# Patient Record
Sex: FEMALE | Race: WHITE | NOT HISPANIC OR LATINO | Employment: FULL TIME | URBAN - METROPOLITAN AREA
[De-identification: names, ages, dates, MRNs, and addresses within clinical notes are randomized per-mention and may not be internally consistent; named-entity substitution may affect disease eponyms.]

---

## 2017-04-18 ENCOUNTER — HOSPITAL ENCOUNTER (OUTPATIENT)
Dept: RADIOLOGY | Age: 42
Discharge: HOME | End: 2017-04-18
Payer: COMMERCIAL

## 2017-04-18 DIAGNOSIS — R92.2 INCONCLUSIVE MAMMOGRAM: ICD-10-CM

## 2017-04-18 DIAGNOSIS — Z12.31 ENCOUNTER FOR SCREENING MAMMOGRAM FOR MALIGNANT NEOPLASM OF BREAST: ICD-10-CM

## 2017-07-11 ENCOUNTER — HOSPITAL ENCOUNTER (OUTPATIENT)
Dept: RADIOLOGY | Age: 42
Discharge: HOME | End: 2017-07-11
Payer: COMMERCIAL

## 2017-07-11 PROCEDURE — 77063 BREAST TOMOSYNTHESIS BI: CPT

## 2017-07-11 PROCEDURE — G0202 SCR MAMMO BI INCL CAD: HCPCS

## 2017-09-28 ENCOUNTER — ALLSCRIPTS OFFICE VISIT (OUTPATIENT)
Dept: OTHER | Facility: OTHER | Age: 42
End: 2017-09-28

## 2017-10-27 NOTE — PROGRESS NOTES
Assessment  1  Encounter for annual routine gynecological examination (V72 31) (Z01 419)    Plan  Contraceptive management    · Sprintec 28 0 25-35 MG-MCG Oral Tablet; TAKE 1 TABLET DAILY   Rx By: Hailee Mazariegos; Dispense: 84 Days ; #:84 Tablet; Refill: 0;For: Contraceptive management; CHALINO = N; Verified Transmission to [facility] (Mail Order); Last Updated By: System, Voxa; 9/28/2017 11:57:15 AM  Encounter for screening mammogram for breast cancer    · MAMMO SCREENING BILATERAL W 3D & CAD; Status:Hold For - Exact  Date,Retrospective By Protocol Authorization; Requested for:After [id number]; Perform:[facility]; Last Updated Yeison Steen; 9/28/2017 12:00:07 PM;Ordered;For:Encounter for screening mammogram for breast cancer; Ordered By:Amilcar Lisa;    Discussion/Summary    #1  Pap smear deferred due to low risk status, last Pap 2016Encouraged self breast examination as well as calcium supplementationContinue Sprintec times one yearContinue annual mammogram-3-D due to breast densityReturn to office in one year  The patient has the current Goals: Continue preventative screening  The patent has the current Barriers: No barriers  Patient is able to Self-Care  Possible side effects of new medications were reviewed with the patient/guardian today  The treatment plan was reviewed with the patient/guardian  The patient/guardian understands and agrees with the treatment plan      Chief Complaint  Annual visit      History of Present Illness  HPI: This is a 13-year-old female presents for her annual GYN exam  She offers no complaints today  Her menstrual cycles are regular every 4 weeks lasting 3-4 days with no breakthrough bleeding  She states that she feels better on birth control pills  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, 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  She ultimately conceived through IVF   She is up to date with her mammogram  is sexually active and in a monogamous relationship x 15 years with her   All her Pap smears have been normal       Review of Systems    Cardiovascular: no complaints of slow or fast heart rate, no chest pain, no palpitations, no leg claudication or lower extremity edema  Respiratory: no complaints of shortness of breath, no wheezing, no dyspnea on exertion, no orthopnea or PND  Breasts: no complaints of breast pain, breast lump or nipple discharge  Gastrointestinal: no complaints of abdominal pain, no constipation, no nausea or diarrhea, no vomiting, no bloody stools  Genitourinary: no complaints of dysuria, no incontinence, no pelvic pain, no dysmenorrhea, no vaginal discharge or abnormal vaginal bleeding  Over the past 2 weeks, how often have you been bothered by the following problems? 1 ) Little interest or pleasure in doing things? Not at all    2 ) Feeling down, depressed or hopeless? Not at all    3 ) Trouble falling asleep or sleeping too much? Not at all    4 ) Feeling tired or having little energy? Not at all    5 ) Poor appetite or overeating? Not at all    6 ) Feeling bad about yourself, or that you are a failure, or have let yourself or your family down? Not at all    7 ) Trouble concentrating on things, such as reading a newspaper or watching television? Not at all    8 ) Moving or speaking so slowly that other people could have noticed, or the opposite, moving or speaking faster than usual? Not at all    9 ) Thoughts that you would be better off dead or of hurting yourself in some way? Not at all  Score 0     ROS reviewed  OB History  Pregnancy History (Brief):   Prior pregnancies: : 1  Para:   Delivery type: 1  section       Active Problems  1  Acute upper respiratory infection (465 9) (J06 9)   2  Breast density (611 79) (R92 2)   3  Chronic allergic conjunctivitis (372 14) (H10 45)   4  Contraceptive management (V25 9) (Z30 9)   5   Encounter for annual routine gynecological examination (V72 31) (Z01 419) 6  Encounter for routine gynecological examination (V72 31) (Z01 419)   7  Encounter for screening mammogram for breast cancer (V76 12) (Z12 31)   8  Irritable bowel syndrome (564 1) (K58 9)   9  Migraine headache (346 90) (G43 909)   10  Need for influenza vaccination (V04 81) (Z23)   11  Palpitations (785 1) (R00 2)   12  Pityriasis rosea (696 3) (L42)   13  Tear film insufficiency, unspecified laterality (375 15) (H17 118)    Past Medical History   · Acute bronchitis (466 0) (J20 9)   · Acute pharyngitis (462) (J02 9)   · History of HELLP syndrome (642 50) (O14 20)   · History of acute sinusitis (V12 69) (Z87 09)   · History of anemia (V12 3) (Z86 2)   · History of infertility (V13 29) (Z87 42)   · History of Irregular menses (626 4) (N92 6)   · Pityriasis rosea (696 3) (L42)    The active problems and past medical history were reviewed and updated today  Surgical History   · History of  Section   · History of Complete Colonoscopy   · History of Knee Arthroscopy (Therapeutic)    The surgical history was reviewed and updated today  Family History  Mother    · Family history of Diabetes Mellitus (V18 0)  Father    · Family history of Benign Essential Hypertension   · Family history of Hypertension (V17 49)  Paternal Grandmother    · Family history of Breast Cancer (V16 3)    The family history was reviewed and updated today  Social History   · Being A Social Drinker   · Denied: History of Drug Use   · Educational Level - Completed Bachelors Degree   · Marital History - Currently    · 1 CHILD   · Never A Smoker   · Occupation:   · Human relations  The social history was reviewed and updated today  Current Meds   1  Sprintec 28 0 25-35 MG-MCG Oral Tablet; TAKE 1 TABLET DAILY; Therapy: [id number] to [phone])  Requested for: 2017; Last   Rx:2017 Ordered    Allergies  1   No Known Drug Allergies    Vitals   Recorded: [id number] 49:80NX   Systolic 645   Diastolic 80   Height 5 ft 7 3 in   Weight 189 lb    BMI Calculated 29 34   BSA Calculated 1 99   LMP 06-Sep-2017     Physical Exam    Constitutional   General appearance: No acute distress, well appearing and well nourished  Pulmonary   Auscultation of lungs: Clear to auscultation  Cardiovascular   Auscultation of heart: Normal rate and rhythm, normal S1 and S2, no murmurs  Genitourinary   External genitalia: Normal and no lesions appreciated  Vagina: Normal, no lesions or dryness appreciated  Urethral meatus: Normal     Cervix: Normal, no palpable masses  Uterus: Normal, non-tender, not enlarged, and no palpable masses  Adnexa/parametria: Normal, non-tender and no fullness or masses appreciated  Chest   Breasts: Normal and no dimpling or skin changes noted  Abdomen   Abdomen: Normal, non-tender, and no organomegaly noted         Signatures   Electronically signed by : Bernard Cho DO; Sep 28 2017 12:03PM EST                       (Author)

## 2018-01-13 VITALS
DIASTOLIC BLOOD PRESSURE: 80 MMHG | BODY MASS INDEX: 29.66 KG/M2 | HEIGHT: 67 IN | WEIGHT: 189 LBS | SYSTOLIC BLOOD PRESSURE: 116 MMHG

## 2018-01-17 NOTE — RESULT NOTES
Verified Results  (1) CBC/PLT/DIFF 30Apr2016 10:38AM [facility]     Test Name Result Flag Reference   WBC COUNT 6 90 Thousand/uL  4 31-10 16   RBC COUNT 4 11 Million/uL  3 81-5 12   HEMOGLOBIN 11 7 g/dL  11 5-15 4   HEMATOCRIT 36 3 %  34 8-46  1   MCV 88 fL  82-98   MCH 28 5 pg  26 8-34 3   MCHC 32 2 g/dL  31 4-37 4   RDW 13 3 %  11 6-15 1   MPV 11 9 fL  8 9-12 7   PLATELET COUNT 574 Thousands/uL  149-390   nRBC AUTOMATED 0 /100 WBCs     NEUTROPHILS RELATIVE PERCENT 64 %  43-75   LYMPHOCYTES RELATIVE PERCENT 27 %  14-44   MONOCYTES RELATIVE PERCENT 8 %  4-12   EOSINOPHILS RELATIVE PERCENT 1 %  0-6   BASOPHILS RELATIVE PERCENT 0 %  0-1   NEUTROPHILS ABSOLUTE COUNT 4 38 Thousands/µL  1 85-7 62   LYMPHOCYTES ABSOLUTE COUNT 1 87 Thousands/µL  0 60-4 47   MONOCYTES ABSOLUTE COUNT 0 54 Thousand/µL  0 17-1 22   EOSINOPHILS ABSOLUTE COUNT 0 06 Thousand/µL  0 00-0 61   BASOPHILS ABSOLUTE COUNT 0 03 Thousands/µL  0 00-0 10     (1) COMPREHENSIVE METABOLIC PANEL [id number] 19:53UY [facility] recommendations are as follows:  GFR calculation is accurate only with a steady state creatinine  Chronic Kidney disease less than 60 ml/min/1 73 sq  meters  Kidney failure less than 15 ml/min/1 73 sq  meters  Test Name Result Flag Reference   GLUCOSE,RANDM 81 mg/dL     If the patient is fasting, the ADA then defines impaired fasting glucose as > 100 mg/dL and diabetes as > or equal to 123 mg/dL     SODIUM 138 mmol/L  136-145   POTASSIUM 4 7 mmol/L  3 5-5 3   CHLORIDE 105 mmol/L  100-108   CARBON DIOXIDE 28 mmol/L  21-32   ANION GAP (CALC) 5 mmol/L  4-13   BLOOD UREA NITROGEN 17 mg/dL  5-25   CREATININE 0 91 mg/dL  0 60-1 30   Standardized to IDMS reference method   CALCIUM 8 9 mg/dL  8 3-10 1   BILI, TOTAL 0 34 mg/dL  0 20-1 00   ALK PHOSPHATAS 80 U/L     ALT (SGPT) 30 U/L  12-78   AST(SGOT) 15 U/L  5-45   ALBUMIN 3 4 g/dL L 3 5-5 0   TOTAL PROTEIN 7 3 g/dL  6 4-8 2 eGFR Non-African American      >60 0 ml/min/1 73sq m     (1) MAGNESIUM [id number] 10:38AM Critical [facility] hospital     Test Name Result Flag Reference   MAGNESIUM 2 2 mg/dL  1 6-2 6     (1) TSH WITH FT4 REFLEX [id number] 10:38AM [facility]   Patients undergoing fluorescein dye angiography may retain small amounts of fluorescein in the body for 48-72 hours post procedure  Samples containing fluorescein can produce falsely depressed TSH values  If the patient had this procedure,a specimen should be resubmitted post fluorescein clearance  The recommended reference ranges for TSH during pregnancy are as follows:  First trimester 0 1 to 2 5 uIU/mL  Second trimester  0 2 to 3 0 uIU/mL  Third trimester 0 3 to 3 0 uIU/m     Test Name Result Flag Reference   TSH 1 100 uIU/mL  0 358-3 740       Discussion/Summary   Labs look OK   Dr Vijaya Sorensen

## 2018-07-01 DIAGNOSIS — Z12.31 ENCOUNTER FOR SCREENING MAMMOGRAM FOR MALIGNANT NEOPLASM OF BREAST: ICD-10-CM

## 2018-10-09 ENCOUNTER — ANNUAL EXAM (OUTPATIENT)
Dept: OBGYN CLINIC | Facility: CLINIC | Age: 43
End: 2018-10-09
Payer: COMMERCIAL

## 2018-10-09 VITALS
DIASTOLIC BLOOD PRESSURE: 86 MMHG | BODY MASS INDEX: 31.49 KG/M2 | WEIGHT: 207.8 LBS | HEIGHT: 68 IN | SYSTOLIC BLOOD PRESSURE: 116 MMHG

## 2018-10-09 DIAGNOSIS — N92.1 EXCESSIVE AND FREQUENT MENSTRUATION WITH IRREGULAR CYCLE: ICD-10-CM

## 2018-10-09 DIAGNOSIS — Z12.39 BREAST CANCER SCREENING: ICD-10-CM

## 2018-10-09 DIAGNOSIS — Z01.419 ENCOUNTER FOR ANNUAL ROUTINE GYNECOLOGICAL EXAMINATION: Primary | ICD-10-CM

## 2018-10-09 PROCEDURE — 87624 HPV HI-RISK TYP POOLED RSLT: CPT | Performed by: OBSTETRICS & GYNECOLOGY

## 2018-10-09 PROCEDURE — G0145 SCR C/V CYTO,THINLAYER,RESCR: HCPCS | Performed by: OBSTETRICS & GYNECOLOGY

## 2018-10-09 PROCEDURE — S0612 ANNUAL GYNECOLOGICAL EXAMINA: HCPCS | Performed by: OBSTETRICS & GYNECOLOGY

## 2018-10-09 RX ORDER — NORGESTIMATE AND ETHINYL ESTRADIOL
KIT
COMMUNITY
Start: 2018-08-14 | End: 2018-10-09 | Stop reason: SDUPTHER

## 2018-10-09 RX ORDER — NORGESTIMATE AND ETHINYL ESTRADIOL
1 KIT DAILY
Qty: 84 TABLET | Refills: 3 | Status: SHIPPED | OUTPATIENT
Start: 2018-10-09 | End: 2019-10-11 | Stop reason: SDUPTHER

## 2018-10-09 NOTE — PROGRESS NOTES
Assessment/Plan:    Pap smear done as well as annual   Encouraged self-breast examination as well as calcium supplementation  Continue annual 3D mammogram   Continue OCPs x1 year  I am recommending repeat CBC with diff, ferritin and iron given her history of iron deficiency anemia  Return to office in 1 year  I will notify her the above results via telephone  No problem-specific Assessment & Plan notes found for this encounter  Diagnoses and all orders for this visit:    Encounter for annual routine gynecological examination    Breast cancer screening  -     Mammo screening bilateral w 3d & cad; Future    Excessive and frequent menstruation with irregular cycle  -     PREVIFEM 0 25-35 MG-MCG per tablet; Take 1 tablet by mouth daily  -     CBC and differential  -     Ferritin  -     Iron    Other orders  -     Discontinue: PREVIFEM 0 25-35 MG-MCG per tablet;           Subjective:      Patient ID: Devi Montana is a 37 y o  female  HPI     This is a 15-year-old female  ( x1, age 3) presents for her annual gyn exam   Patient has been on birth control pills for the last 3 years for cycle control  She states there significant improvement, every 28 days lasting 3-4 days with no breakthrough bleeding  Her menstrual flow has decreased significantly  She did have routine labs done approximately 18 months ago and was informed that she was iron deficient  She denies any changes in bowel or bladder function  She is sexually active and has been in a monogamous relationship with her  for over 16 years  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, 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      The following portions of the patient's history were reviewed and updated as appropriate: allergies, current medications, past family history, past medical history, past social history, past surgical history and problem list     Review of Systems   Constitutional: Negative for fatigue, fever and unexpected weight change  Respiratory: Negative for cough, chest tightness, shortness of breath and wheezing  Cardiovascular: Negative  Negative for chest pain and palpitations  Gastrointestinal: Negative  Negative for abdominal distention, abdominal pain, blood in stool, constipation, diarrhea, nausea and vomiting  Genitourinary: Negative  Negative for difficulty urinating, dyspareunia, dysuria, flank pain, frequency, genital sores, hematuria, pelvic pain, urgency, vaginal bleeding, vaginal discharge and vaginal pain  Skin: Negative for rash  Objective:      /86   Ht 5' 8" (1 727 m)   Wt 94 3 kg (207 lb 12 8 oz)   LMP 10/03/2018 (Exact Date)   Breastfeeding? No   BMI 31 60 kg/m²          Physical Exam   Constitutional: She appears well-developed and well-nourished  Cardiovascular: Normal rate and regular rhythm  Pulmonary/Chest: Effort normal and breath sounds normal  Right breast exhibits no inverted nipple, no mass, no nipple discharge, no skin change and no tenderness  Left breast exhibits no inverted nipple, no mass, no nipple discharge, no skin change and no tenderness  Abdominal: Soft  Bowel sounds are normal  She exhibits no distension  There is no tenderness  There is no rebound and no guarding  Genitourinary: Rectum normal, vagina normal and uterus normal  There is no lesion on the right labia  There is no lesion on the left labia  Cervix exhibits no discharge and no friability  Right adnexum displays no mass, no tenderness and no fullness  Left adnexum displays no mass, no tenderness and no fullness  No vaginal discharge found

## 2018-10-11 LAB
HPV HR 12 DNA CVX QL NAA+PROBE: NEGATIVE
HPV16 DNA CVX QL NAA+PROBE: NEGATIVE
HPV18 DNA CVX QL NAA+PROBE: NEGATIVE
LAB AP GYN PRIMARY INTERPRETATION: NORMAL
LAB AP LMP: NORMAL
Lab: NORMAL

## 2018-11-16 ENCOUNTER — APPOINTMENT (OUTPATIENT)
Dept: LAB | Age: 43
End: 2018-11-16
Payer: COMMERCIAL

## 2018-11-16 LAB
BASOPHILS # BLD AUTO: 0.04 THOUSANDS/ΜL (ref 0–0.1)
BASOPHILS NFR BLD AUTO: 1 % (ref 0–1)
EOSINOPHIL # BLD AUTO: 0.1 THOUSAND/ΜL (ref 0–0.61)
EOSINOPHIL NFR BLD AUTO: 2 % (ref 0–6)
ERYTHROCYTE [DISTWIDTH] IN BLOOD BY AUTOMATED COUNT: 12.4 % (ref 11.6–15.1)
FERRITIN SERPL-MCNC: 37 NG/ML (ref 8–388)
HCT VFR BLD AUTO: 35.7 % (ref 34.8–46.1)
HGB BLD-MCNC: 11.4 G/DL (ref 11.5–15.4)
IMM GRANULOCYTES # BLD AUTO: 0.01 THOUSAND/UL (ref 0–0.2)
IMM GRANULOCYTES NFR BLD AUTO: 0 % (ref 0–2)
IRON SERPL-MCNC: 48 UG/DL (ref 50–170)
LYMPHOCYTES # BLD AUTO: 1.86 THOUSANDS/ΜL (ref 0.6–4.47)
LYMPHOCYTES NFR BLD AUTO: 29 % (ref 14–44)
MCH RBC QN AUTO: 29.1 PG (ref 26.8–34.3)
MCHC RBC AUTO-ENTMCNC: 31.9 G/DL (ref 31.4–37.4)
MCV RBC AUTO: 91 FL (ref 82–98)
MONOCYTES # BLD AUTO: 0.48 THOUSAND/ΜL (ref 0.17–1.22)
MONOCYTES NFR BLD AUTO: 8 % (ref 4–12)
NEUTROPHILS # BLD AUTO: 3.94 THOUSANDS/ΜL (ref 1.85–7.62)
NEUTS SEG NFR BLD AUTO: 60 % (ref 43–75)
NRBC BLD AUTO-RTO: 0 /100 WBCS
PLATELET # BLD AUTO: 233 THOUSANDS/UL (ref 149–390)
PMV BLD AUTO: 12.9 FL (ref 8.9–12.7)
RBC # BLD AUTO: 3.92 MILLION/UL (ref 3.81–5.12)
WBC # BLD AUTO: 6.43 THOUSAND/UL (ref 4.31–10.16)

## 2018-11-16 PROCEDURE — 85025 COMPLETE CBC W/AUTO DIFF WBC: CPT | Performed by: OBSTETRICS & GYNECOLOGY

## 2018-11-16 PROCEDURE — 82728 ASSAY OF FERRITIN: CPT | Performed by: OBSTETRICS & GYNECOLOGY

## 2018-11-16 PROCEDURE — 83540 ASSAY OF IRON: CPT | Performed by: OBSTETRICS & GYNECOLOGY

## 2018-11-16 PROCEDURE — 36415 COLL VENOUS BLD VENIPUNCTURE: CPT | Performed by: OBSTETRICS & GYNECOLOGY

## 2018-11-20 ENCOUNTER — TELEPHONE (OUTPATIENT)
Dept: OBGYN CLINIC | Facility: CLINIC | Age: 43
End: 2018-11-20

## 2018-11-20 NOTE — TELEPHONE ENCOUNTER
----- Message from Azul Wheeler DO sent at 11/18/2018  6:37 PM EST -----  Inform pt labs c/w slight iron deficiency anemia, rec feso4 during week of menses   Call if menses increases

## 2018-12-14 ENCOUNTER — HOSPITAL ENCOUNTER (OUTPATIENT)
Dept: RADIOLOGY | Age: 43
Discharge: HOME | End: 2018-12-14
Payer: COMMERCIAL

## 2018-12-14 VITALS — BODY MASS INDEX: 32.96 KG/M2 | WEIGHT: 210 LBS | HEIGHT: 67 IN

## 2018-12-14 DIAGNOSIS — Z12.39 BREAST CANCER SCREENING: ICD-10-CM

## 2018-12-14 PROCEDURE — 77063 BREAST TOMOSYNTHESIS BI: CPT

## 2018-12-14 PROCEDURE — 77067 SCR MAMMO BI INCL CAD: CPT

## 2019-09-12 ENCOUNTER — OFFICE VISIT (OUTPATIENT)
Dept: FAMILY MEDICINE CLINIC | Facility: CLINIC | Age: 44
End: 2019-09-12
Payer: COMMERCIAL

## 2019-09-12 VITALS
DIASTOLIC BLOOD PRESSURE: 80 MMHG | TEMPERATURE: 98.5 F | HEIGHT: 67 IN | HEART RATE: 75 BPM | WEIGHT: 205 LBS | BODY MASS INDEX: 32.18 KG/M2 | SYSTOLIC BLOOD PRESSURE: 110 MMHG | RESPIRATION RATE: 16 BRPM | OXYGEN SATURATION: 99 %

## 2019-09-12 DIAGNOSIS — G43.009 MIGRAINE WITHOUT AURA AND WITHOUT STATUS MIGRAINOSUS, NOT INTRACTABLE: ICD-10-CM

## 2019-09-12 DIAGNOSIS — Z00.00 WELL ADULT EXAM: Primary | ICD-10-CM

## 2019-09-12 DIAGNOSIS — R53.83 OTHER FATIGUE: ICD-10-CM

## 2019-09-12 DIAGNOSIS — E61.1 LOW IRON: ICD-10-CM

## 2019-09-12 DIAGNOSIS — Z23 NEED FOR VACCINATION: ICD-10-CM

## 2019-09-12 DIAGNOSIS — E53.8 LOW SERUM VITAMIN B12: ICD-10-CM

## 2019-09-12 DIAGNOSIS — D64.9 ANEMIA, UNSPECIFIED TYPE: ICD-10-CM

## 2019-09-12 DIAGNOSIS — R55 PRE-SYNCOPE: ICD-10-CM

## 2019-09-12 PROBLEM — B00.1 RECURRENT COLD SORES: Status: ACTIVE | Noted: 2019-09-12

## 2019-09-12 PROCEDURE — 90471 IMMUNIZATION ADMIN: CPT | Performed by: FAMILY MEDICINE

## 2019-09-12 PROCEDURE — 99396 PREV VISIT EST AGE 40-64: CPT | Performed by: FAMILY MEDICINE

## 2019-09-12 PROCEDURE — 3008F BODY MASS INDEX DOCD: CPT | Performed by: FAMILY MEDICINE

## 2019-09-12 PROCEDURE — 90715 TDAP VACCINE 7 YRS/> IM: CPT | Performed by: FAMILY MEDICINE

## 2019-09-12 PROCEDURE — 99213 OFFICE O/P EST LOW 20 MIN: CPT | Performed by: FAMILY MEDICINE

## 2019-09-12 PROCEDURE — 93000 ELECTROCARDIOGRAM COMPLETE: CPT | Performed by: FAMILY MEDICINE

## 2019-09-12 RX ORDER — VALACYCLOVIR HYDROCHLORIDE 1 G/1
TABLET, FILM COATED ORAL
Refills: 3 | COMMUNITY
Start: 2019-08-04 | End: 2021-11-26 | Stop reason: SDUPTHER

## 2019-09-12 NOTE — PROGRESS NOTES
Assessment/Plan:   Problems/concerns addressed in this visit are addressed in the attached note  Well adult exam  ·         Continue healthy diet   ·         Encourage exercise 4 times a week or more for minimum 30 minutes  ·         Continue to see dentist, wear seatbelt  ·         Health maintenance reviewed and up-to-date - Tdap today  Will get flu shot next month  Update fasting blood work  Reviewed age appropriate health maintenance screenings and immunizations that are due, risks and benefits of these  Health Maintenance   Topic Date Due    BMI: Followup Plan  03/04/1991    DTaP,Tdap,and Td Vaccines (1 - Tdap) 03/04/1994    INFLUENZA VACCINE  07/01/2019    Depression Screening PHQ  10/09/2019    BMI: Adult  12/14/2019    MAMMOGRAM  12/14/2019    PAP SMEAR  10/09/2023    Pneumococcal Vaccine: 65+ Years (1 of 2 - PCV13) 03/04/2038    Pneumococcal Vaccine: Pediatrics (0 to 5 Years) and At-Risk Patients (6 to 59 Years)  Aged Out    HEPATITIS B VACCINES  Aged Out     Return for Annual physical   Patient Instructions     Please get fasting blood work  I will send the results through [address]  Then we can come up with a plan for your headaches  I will see you back after we work on this plan  A medicine I am thinking of for your is Topamax  Chart your headaches:  HEADACHE DIARY   MONTH   DATE  TIME  (eg  Morning, evening etc )  DURATION  SEVERITY  ( 0-10 )  TRIGGERS  MEDICATION  & DOSE  RESPONSE  to medicine  COMMENTS                                                                          EXAMPLES OF TRIGGERS  Stress, Lack of sleep, Excessive sleep, Menstrual days, Changes in weather,  Excessive Video games, TV, computers, cell phones  Starvation / Skipping meals, Poor hydration,   MSG, artificial sweeteners, preservatives like nitrates and nitrites    Processed meat, Canned foods, nuts, Excessive caffeine etc               Subjective:    HPI     Kleberjer Ramos is a 53 y o  female who presents today for a physical      Chief Complaint   Patient presents with    Migraine    Fatigue     Patient felt faint with nausea, ear ringing       ---Above per clinical staff & reviewed  ---    Diet: good , WW in past   Exercise:  2 times a week [employer] bike   Dental visits:  yes  Seatbelt: yes     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 dentist   Wears seatbelt       Concerns today: addressed in additional note today           The following portions of the patient's history were reviewed and updated as appropriate: allergies, current medications, past family history, past medical history, past social history, past surgical history and problem list      Current Outpatient Medications   Medication Sig Dispense Refill    valACYclovir (VALTREX) 1,000 mg tablet TAKE TWO TABS BY MOUTH AT ONSET, THEN TWO TABS 12 HRS  LATER  3    PREVIFEM 0 25-35 MG-MCG per tablet Take 1 tablet by mouth daily 84 tablet 3     No current facility-administered medications for this visit  Objective:      /80 (BP Location: Left arm)   Pulse 75   Temp 98 5 °F (36 9 °C)   Resp 16   Ht 5' 6 93" (1 7 m)   Wt 93 kg (205 lb)   SpO2 99%   BMI 32 18 kg/m²     BP Readings from Last 3 Encounters:   09/12/19 110/80   10/09/18 116/86   09/28/17 116/80     Wt Readings from Last 3 Encounters:   09/12/19 93 kg (205 lb)   12/14/18 95 3 kg (210 lb)   10/09/18 94 3 kg (207 lb 12 8 oz)       Review of Systems  She has no other concerns  No unexpected weight changes  No chest pain, SOB, + palpitations  No GERD  No changes in bowels or bladder  Sleeping well  No mood changes  + headache   Physical Exam   Physical Exam   Constitutional:  she appears well-developed and well-nourished  HENT: Head: Normocephalic  Neck: Neck supple     Cardiovascular: Normal rate, regular rhythm and normal heart sounds    Pulmonary/Chest: Effort normal and breath sounds normal  No wheezes, rales, or rhonchi  Abdominal: Soft  Bowel sounds are normal  There is no tenderness  No hepatosplenomegaly  Musculoskeletal: she exhibits no edema  Lymphadenopathy: she has no cervical adenopathy  Neurological: she is alert and oriented to person, place, and time  Skin: Skin is warm and dry  Psychiatric: she has a normal mood and affect   her behavior is normal  Thought content normal

## 2019-09-12 NOTE — PATIENT INSTRUCTIONS
Please get fasting blood work  I will send the results through [address]  Then we can come up with a plan for your headaches  I will see you back after we work on this plan  A medicine I am thinking of for your is Topamax  Chart your headaches:  HEADACHE DIARY   MONTH   DATE  TIME  (eg  Morning, evening etc )  DURATION  SEVERITY  ( 0-10 )  TRIGGERS  MEDICATION  & DOSE  RESPONSE  to medicine  COMMENTS                                                                          EXAMPLES OF TRIGGERS  Stress, Lack of sleep, Excessive sleep, Menstrual days, Changes in weather,  Excessive Video games, TV, computers, cell phones  Starvation / Skipping meals, Poor hydration,   MSG, artificial sweeteners, preservatives like nitrates and nitrites    Processed meat, Canned foods, nuts, Excessive caffeine etc

## 2019-09-12 NOTE — PROGRESS NOTES
Assessment/Plan:  1  Other fatigue  Differential dx reviewed  Will start with labs  No snoring noted  No exposures noted  Does not endorse any depression or anxiety symptoms    - CBC and differential; Future  - Comprehensive metabolic panel; Future  - Ferritin; Future  - Lipid panel; Future  - Vitamin B12; Future  - Vitamin D 25 hydroxy; Future  - TSH, 3rd generation with Free T4 reflex; Future  - Iron; Future    2  Pre-syncope  Episode consistent with vasovagal episode  Again, will check labs  EKG today no acute changes  No further workup needed at this time, but to let us know if any other episdoes    - POCT ECG  - CBC and differential; Future  - Comprehensive metabolic panel; Future  - Ferritin; Future  - Lipid panel; Future  - Vitamin B12; Future  - Vitamin D 25 hydroxy; Future  - TSH, 3rd generation with Free T4 reflex; Future  - Iron; Future    3  Need for vaccination  - TDAP VACCINE GREATER THAN OR EQUAL TO 6YO IM    4  Anemia, unspecified type  Update labs with iron and B12   - CBC and differential; Future  - Comprehensive metabolic panel; Future  - Ferritin; Future  - Lipid panel; Future  - Vitamin B12; Future  - Vitamin D 25 hydroxy; Future  - TSH, 3rd generation with Free T4 reflex; Future  - Iron; Future    5  Low iron  Update labs   - CBC and differential; Future  - Comprehensive metabolic panel; Future  - Ferritin; Future  - Lipid panel; Future  - Vitamin B12; Future  - Vitamin D 25 hydroxy; Future  - TSH, 3rd generation with Free T4 reflex; Future  - Iron; Future    6  Low serum vitamin B12  Update labs   - CBC and differential; Future  - Comprehensive metabolic panel; Future  - Ferritin; Future  - Lipid panel; Future  - Vitamin B12; Future  - Vitamin D 25 hydroxy; Future  - TSH, 3rd generation with Free T4 reflex; Future  - Iron; Future    7  Migraine without aura and without status migrainosus, not intractable  Pt with frequent headaches  Will need treatment  Start with labs for a treatable cause  Chart headache to look for triggers  If this do not point to anything she is willing to start a daily med for treatment  Options reviewed - consider Topamax  Side effects risks and benefits reviewed  6  Well adult exam  See other notes    BMI Counseling: Body mass index is 32 18 kg/m²  Discussed the patient's BMI with her  The BMI is above normal  Nutrition recommendations include reducing portion sizes  Exercise recommendations include exercising 3-5 times per week  Return for Annual physical   Patient Instructions     Please get fasting blood work  I will send the results through [address]  Then we can come up with a plan for your headaches  I will see you back after we work on this plan  A medicine I am thinking of for your is Topamax  Chart your headaches:  HEADACHE DIARY   MONTH   DATE  TIME  (eg  Morning, evening etc )  DURATION  SEVERITY  ( 0-10 )  TRIGGERS  MEDICATION  & DOSE  RESPONSE  to medicine  COMMENTS                                                                          EXAMPLES OF TRIGGERS  Stress, Lack of sleep, Excessive sleep, Menstrual days, Changes in weather,  Excessive Video games, TV, computers, cell phones  Starvation / Skipping meals, Poor hydration,   MSG, artificial sweeteners, preservatives like nitrates and nitrites  Processed meat, Canned foods, nuts, Excessive caffeine etc              Subjective:   Immanuel Velasquez is a 53 y o  female here today for a follow-up on her current medical conditions:  Patient Active Problem List   Diagnosis    Irritable bowel syndrome    Migraine without aura and without status migrainosus, not intractable    Palpitations    Tear film insufficiency    Recurrent cold sores        Current Outpatient Medications   Medication Sig Dispense Refill    valACYclovir (VALTREX) 1,000 mg tablet TAKE TWO TABS BY MOUTH AT ONSET, THEN TWO TABS 12 HRS   LATER  3    PREVIFEM 0 25-35 MG-MCG per tablet Take 1 tablet by mouth daily 84 tablet 3     No current facility-administered medications for this visit  HPI:  Chief Complaint   Patient presents with    Migraine    Fatigue     Patient felt faint with nausea, ear ringing       -- Above per clinical staff and reviewed  --    PHQ-9 Depression Screening    PHQ-9:    Frequency of the following problems over the past two weeks:       Little interest or pleasure in doing things:  0 - not at all  Feeling down, depressed, or hopeless:  0 - not at all  PHQ-2 Score:  0       No My Sticky Note on file  Today:  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 dentist   Wears seatbelt  Getting 8 hours of sleep but still tired in the morning   Does not feel she snores  Weight up and down forever  Joined [employer] 6 months ago and lost 10 pounds  Normal bowels     Saturday nirhg - 5 days ago   Was out with friends was eating and sidden onset fetl like would pass out ears ringing, clammy, diarrhea, felt would pass out- slept until the next morning  Believes she had normal meals that day   Had a beer  Nice day that day, was drinking water  Headache with this  Temple to top of head  Stabbing pain  8/10 pain wanted out of light and noise like her migraine  Slept until the next morning  Headache in general  3/7 days a week temple headache   Eyes checked   Drops in eyes   Drinks lots of water  8 hours of sleep  They are usually 5-6/10  Can wake up with them or they come on  Last half the day  Takes aleve, usually helps  Sometimes does not help    + light and noise bothersome  Not bothered by movement  Better with dark  This started in the last month  Low B in past   Periods Q 4 weeks on OCP , 5 days, heavy for 2 days - change pad Q 2 hours       The following portions of the patient's history were reviewed and updated as appropriate: allergies, current medications, past family history, past medical history, past social history, past surgical history and problem list     Objective:  Vitals:  /80 (BP Location: Left arm)   Pulse 75   Temp 98 5 °F (36 9 °C)   Resp 16   Ht 5' 6 93" (1 7 m)   Wt 93 kg (205 lb)   SpO2 99%   BMI 32 18 kg/m²    Wt Readings from Last 3 Encounters:   09/12/19 93 kg (205 lb)   12/14/18 95 3 kg (210 lb)   10/09/18 94 3 kg (207 lb 12 8 oz)      BP Readings from Last 3 Encounters:   09/12/19 110/80   10/09/18 116/86   09/28/17 116/80        Review of Systems   She has no other concerns  No unexpected weight changes  No chest pain, SOB, + palpitations  No GERD  No changes in bowels or bladder  Sleeping well  No mood changes  + headache     Physical Exam   Constitutional:  she appears well-developed and well-nourished  HENT: Head: Normocephalic  Neck: Neck supple  Cardiovascular: Normal rate, regular rhythm and normal heart sounds  Pulmonary/Chest: Effort normal and breath sounds normal  No wheezes, rales, or rhonchi  Abdominal: Soft  Bowel sounds are normal  There is no tenderness  No hepatosplenomegaly  Musculoskeletal: she exhibits no edema  Lymphadenopathy: she has no cervical adenopathy  Neurological: she is alert and oriented to person, place, and time  Skin: Skin is warm and dry  Psychiatric: she has a normal mood and affect   her behavior is normal  Thought content normal

## 2019-10-11 DIAGNOSIS — N92.1 EXCESSIVE AND FREQUENT MENSTRUATION WITH IRREGULAR CYCLE: ICD-10-CM

## 2019-10-11 RX ORDER — NORGESTIMATE AND ETHINYL ESTRADIOL
1 KIT DAILY
Qty: 84 TABLET | Refills: 0 | Status: SHIPPED | OUTPATIENT
Start: 2019-10-11 | End: 2019-11-26 | Stop reason: SDUPTHER

## 2019-10-28 ENCOUNTER — RX ONLY (RX ONLY)
Age: 44
End: 2019-10-28

## 2019-10-28 ENCOUNTER — DOCTOR'S OFFICE (OUTPATIENT)
Dept: URBAN - METROPOLITAN AREA CLINIC 137 | Facility: CLINIC | Age: 44
Setting detail: OPHTHALMOLOGY
End: 2019-10-28
Payer: COMMERCIAL

## 2019-10-28 DIAGNOSIS — H52.13: ICD-10-CM

## 2019-10-28 DIAGNOSIS — H52.4: ICD-10-CM

## 2019-10-28 DIAGNOSIS — H52.222: ICD-10-CM

## 2019-10-28 PROBLEM — H40.003 GLAUCOMA SUSPECT; BOTH EYES: Status: ACTIVE | Noted: 2019-10-28

## 2019-10-28 PROCEDURE — 92310 CONTACT LENS FITTING OU: CPT | Performed by: OPTOMETRIST

## 2019-10-28 PROCEDURE — 92002 INTRM OPH EXAM NEW PATIENT: CPT | Performed by: OPTOMETRIST

## 2019-10-28 ASSESSMENT — REFRACTION_MANIFEST
OD_VA3: 20/
OS_AXIS: 60
OD_VA2: 20/
OS_SPHERE: -1.50
OS_VA1: 20/
OD_VA1: 20/
OD_CYLINDER: SPH
OS_VA3: 20/
OS_ADD: +1.50
OU_VA: 20/
OS_VA3: 20/
OS_VA2: 20/
OS_CYLINDER: -0.25
OS_VA1: 20/20
OD_ADD: +1.50
OD_VA1: 20/20
OU_VA: 20/
OD_VA3: 20/
OD_VA2: 20/
OS_VA2: 20/
OD_SPHERE: -1.50

## 2019-10-28 ASSESSMENT — SPHEQUIV_DERIVED: OS_SPHEQUIV: -1.625

## 2019-10-28 ASSESSMENT — CONFRONTATIONAL VISUAL FIELD TEST (CVF)
OD_FINDINGS: FULL
OS_FINDINGS: FULL

## 2019-10-31 ASSESSMENT — REFRACTION_CURRENTRX
OS_CYLINDER: -0.50
OD_CYLINDER: SPHERE
OS_SPHERE: -1.00
OD_OVR_VA: 20/
OD_OVR_VA: 20/
OS_OVR_VA: 20/
OS_OVR_VA: 20/
OD_OVR_VA: 20/
OS_OVR_VA: 20/
OS_AXIS: 096
OD_SPHERE: -1.25

## 2019-10-31 ASSESSMENT — REFRACTION_AUTOREFRACTION
OS_CYLINDER: -0.50
OS_SPHERE: -1.75
OS_AXIS: 87
OD_CYLINDER: SPHERE
OD_SPHERE: -1.50

## 2019-10-31 ASSESSMENT — SPHEQUIV_DERIVED: OS_SPHEQUIV: -2

## 2019-10-31 ASSESSMENT — VISUAL ACUITY
OS_BCVA: 20/20-1
OD_BCVA: 20/25-1

## 2019-11-26 ENCOUNTER — LAB (OUTPATIENT)
Dept: LAB | Age: 44
End: 2019-11-26
Payer: COMMERCIAL

## 2019-11-26 ENCOUNTER — ANNUAL EXAM (OUTPATIENT)
Dept: OBGYN CLINIC | Facility: CLINIC | Age: 44
End: 2019-11-26
Payer: COMMERCIAL

## 2019-11-26 VITALS
BODY MASS INDEX: 33.91 KG/M2 | HEIGHT: 66 IN | SYSTOLIC BLOOD PRESSURE: 122 MMHG | WEIGHT: 211 LBS | DIASTOLIC BLOOD PRESSURE: 80 MMHG

## 2019-11-26 DIAGNOSIS — E53.8 LOW SERUM VITAMIN B12: ICD-10-CM

## 2019-11-26 DIAGNOSIS — D64.9 ANEMIA, UNSPECIFIED TYPE: ICD-10-CM

## 2019-11-26 DIAGNOSIS — Z12.39 BREAST CANCER SCREENING: ICD-10-CM

## 2019-11-26 DIAGNOSIS — N92.1 EXCESSIVE AND FREQUENT MENSTRUATION WITH IRREGULAR CYCLE: ICD-10-CM

## 2019-11-26 DIAGNOSIS — R53.83 OTHER FATIGUE: ICD-10-CM

## 2019-11-26 DIAGNOSIS — Z01.419 ENCOUNTER FOR ANNUAL ROUTINE GYNECOLOGICAL EXAMINATION: Primary | ICD-10-CM

## 2019-11-26 DIAGNOSIS — Z00.00 WELL ADULT EXAM: ICD-10-CM

## 2019-11-26 DIAGNOSIS — E61.1 LOW IRON: ICD-10-CM

## 2019-11-26 DIAGNOSIS — R55 PRE-SYNCOPE: ICD-10-CM

## 2019-11-26 LAB
25(OH)D3 SERPL-MCNC: 24 NG/ML (ref 30–100)
ALBUMIN SERPL BCP-MCNC: 3.6 G/DL (ref 3.5–5)
ALP SERPL-CCNC: 65 U/L (ref 46–116)
ALT SERPL W P-5'-P-CCNC: 21 U/L (ref 12–78)
ANION GAP SERPL CALCULATED.3IONS-SCNC: 8 MMOL/L (ref 4–13)
AST SERPL W P-5'-P-CCNC: 15 U/L (ref 5–45)
BASOPHILS # BLD AUTO: 0.03 THOUSANDS/ΜL (ref 0–0.1)
BASOPHILS NFR BLD AUTO: 0 % (ref 0–1)
BILIRUB SERPL-MCNC: 0.33 MG/DL (ref 0.2–1)
BUN SERPL-MCNC: 13 MG/DL (ref 5–25)
CALCIUM SERPL-MCNC: 9.2 MG/DL (ref 8.3–10.1)
CHLORIDE SERPL-SCNC: 105 MMOL/L (ref 100–108)
CHOLEST SERPL-MCNC: 249 MG/DL (ref 50–200)
CO2 SERPL-SCNC: 24 MMOL/L (ref 21–32)
CREAT SERPL-MCNC: 0.93 MG/DL (ref 0.6–1.3)
EOSINOPHIL # BLD AUTO: 0.04 THOUSAND/ΜL (ref 0–0.61)
EOSINOPHIL NFR BLD AUTO: 1 % (ref 0–6)
ERYTHROCYTE [DISTWIDTH] IN BLOOD BY AUTOMATED COUNT: 12.6 % (ref 11.6–15.1)
FERRITIN SERPL-MCNC: 51 NG/ML (ref 8–388)
GFR SERPL CREATININE-BSD FRML MDRD: 74 ML/MIN/1.73SQ M
GLUCOSE P FAST SERPL-MCNC: 80 MG/DL (ref 65–99)
HCT VFR BLD AUTO: 35.3 % (ref 34.8–46.1)
HDLC SERPL-MCNC: 67 MG/DL
HGB BLD-MCNC: 11.3 G/DL (ref 11.5–15.4)
IMM GRANULOCYTES # BLD AUTO: 0.02 THOUSAND/UL (ref 0–0.2)
IMM GRANULOCYTES NFR BLD AUTO: 0 % (ref 0–2)
IRON SERPL-MCNC: 71 UG/DL (ref 50–170)
LDLC SERPL CALC-MCNC: 150 MG/DL (ref 0–100)
LYMPHOCYTES # BLD AUTO: 1.47 THOUSANDS/ΜL (ref 0.6–4.47)
LYMPHOCYTES NFR BLD AUTO: 22 % (ref 14–44)
MCH RBC QN AUTO: 28.8 PG (ref 26.8–34.3)
MCHC RBC AUTO-ENTMCNC: 32 G/DL (ref 31.4–37.4)
MCV RBC AUTO: 90 FL (ref 82–98)
MONOCYTES # BLD AUTO: 0.38 THOUSAND/ΜL (ref 0.17–1.22)
MONOCYTES NFR BLD AUTO: 6 % (ref 4–12)
NEUTROPHILS # BLD AUTO: 4.85 THOUSANDS/ΜL (ref 1.85–7.62)
NEUTS SEG NFR BLD AUTO: 71 % (ref 43–75)
NONHDLC SERPL-MCNC: 182 MG/DL
NRBC BLD AUTO-RTO: 0 /100 WBCS
PLATELET # BLD AUTO: 229 THOUSANDS/UL (ref 149–390)
PMV BLD AUTO: 12.5 FL (ref 8.9–12.7)
POTASSIUM SERPL-SCNC: 3.9 MMOL/L (ref 3.5–5.3)
PROT SERPL-MCNC: 7.6 G/DL (ref 6.4–8.2)
RBC # BLD AUTO: 3.92 MILLION/UL (ref 3.81–5.12)
SODIUM SERPL-SCNC: 137 MMOL/L (ref 136–145)
TRIGL SERPL-MCNC: 162 MG/DL
TSH SERPL DL<=0.05 MIU/L-ACNC: 1.55 UIU/ML (ref 0.36–3.74)
VIT B12 SERPL-MCNC: 359 PG/ML (ref 100–900)
WBC # BLD AUTO: 6.79 THOUSAND/UL (ref 4.31–10.16)

## 2019-11-26 PROCEDURE — 85025 COMPLETE CBC W/AUTO DIFF WBC: CPT

## 2019-11-26 PROCEDURE — 99396 PREV VISIT EST AGE 40-64: CPT | Performed by: OBSTETRICS & GYNECOLOGY

## 2019-11-26 PROCEDURE — 83540 ASSAY OF IRON: CPT

## 2019-11-26 PROCEDURE — 84443 ASSAY THYROID STIM HORMONE: CPT

## 2019-11-26 PROCEDURE — 82306 VITAMIN D 25 HYDROXY: CPT

## 2019-11-26 PROCEDURE — 36415 COLL VENOUS BLD VENIPUNCTURE: CPT

## 2019-11-26 PROCEDURE — 80061 LIPID PANEL: CPT

## 2019-11-26 PROCEDURE — 82728 ASSAY OF FERRITIN: CPT

## 2019-11-26 PROCEDURE — 82607 VITAMIN B-12: CPT

## 2019-11-26 PROCEDURE — 80053 COMPREHEN METABOLIC PANEL: CPT

## 2019-11-26 RX ORDER — NORGESTIMATE AND ETHINYL ESTRADIOL
1 KIT DAILY
Qty: 84 TABLET | Refills: 3 | Status: SHIPPED | OUTPATIENT
Start: 2019-11-26 | End: 2020-01-02

## 2019-11-26 NOTE — PROGRESS NOTES
Assessment/Plan:  Pap smear deferred due to low risk status  Encouraged self-breast examination as well as calcium supplementation  Continue annual 3D mammogram   She will continue OCPs x1 year  We discussed treatment options for menorrhagia including medical versus surgical   All questions answered  She would like to remain conservative with OCPs  She will get her blood work done as prescribed through her primary care physician  Return to office in 1 year or p r n  No problem-specific Assessment & Plan notes found for this encounter  Diagnoses and all orders for this visit:    Encounter for annual routine gynecological examination    Breast cancer screening  -     Mammo screening bilateral w 3d & cad; Future    Excessive and frequent menstruation with irregular cycle  -     PREVIFEM 0 25-35 MG-MCG per tablet; Take 1 tablet by mouth daily          Subjective:      Patient ID: Mauricio Cisneros is a 53 y o  female  HPI     This is a 12-year-old female  ( x1, age 6) presents for her annual gyn exam   She offers no complaints today  Her menstrual cycles are regular every 28 days lasting 5 days, menstrual flow decreased since being on birth control pills for the last 4 years  She denies any changes in bowel or bladder function  Patient is sexually active and has been in a monogamous relationship with her  for over 17 years  Her Pap smears have been normal   Last Pap smear 2018 within normal limits  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, 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  The following portions of the patient's history were reviewed and updated as appropriate: allergies, current medications, past family history, past medical history, past social history, past surgical history and problem list     Review of Systems   Constitutional: Negative for fatigue, fever and unexpected weight change     Respiratory: Negative for cough, chest tightness, shortness of breath and wheezing  Cardiovascular: Negative  Negative for chest pain and palpitations  Gastrointestinal: Negative  Negative for abdominal distention, abdominal pain, blood in stool, constipation, diarrhea, nausea and vomiting  Genitourinary: Negative  Negative for difficulty urinating, dyspareunia, dysuria, flank pain, frequency, genital sores, hematuria, pelvic pain, urgency, vaginal bleeding, vaginal discharge and vaginal pain  Skin: Negative for rash  Objective:      /80   Ht 5' 6" (1 676 m)   Wt 95 7 kg (211 lb)   LMP 10/30/2019 (Approximate)   Breastfeeding? No   BMI 34 06 kg/m²          Physical Exam   Constitutional: She appears well-developed and well-nourished  Cardiovascular: Normal rate and regular rhythm  Pulmonary/Chest: Effort normal and breath sounds normal  Right breast exhibits no inverted nipple, no mass, no nipple discharge, no skin change and no tenderness  Left breast exhibits no inverted nipple, no mass, no nipple discharge, no skin change and no tenderness  Abdominal: Soft  Bowel sounds are normal  She exhibits no distension  There is no tenderness  There is no rebound and no guarding  Genitourinary: Vagina normal and uterus normal  There is no lesion on the right labia  There is no lesion on the left labia  Cervix exhibits no discharge and no friability  Right adnexum displays no mass, no tenderness and no fullness  Left adnexum displays no mass, no tenderness and no fullness  No vaginal discharge found

## 2020-01-01 DIAGNOSIS — N92.1 EXCESSIVE AND FREQUENT MENSTRUATION WITH IRREGULAR CYCLE: ICD-10-CM

## 2020-01-02 RX ORDER — NORGESTIMATE AND ETHINYL ESTRADIOL
KIT
Qty: 84 TABLET | Refills: 0 | Status: SHIPPED | OUTPATIENT
Start: 2020-01-02 | End: 2020-12-02 | Stop reason: SDUPTHER

## 2020-09-23 ENCOUNTER — TELEPHONE (OUTPATIENT)
Dept: OBGYN CLINIC | Facility: CLINIC | Age: 45
End: 2020-09-23

## 2020-11-20 ENCOUNTER — TELEPHONE (OUTPATIENT)
Dept: FAMILY MEDICINE CLINIC | Facility: CLINIC | Age: 45
End: 2020-11-20

## 2020-11-25 ENCOUNTER — TELEMEDICINE (OUTPATIENT)
Dept: FAMILY MEDICINE CLINIC | Facility: CLINIC | Age: 45
End: 2020-11-25
Payer: COMMERCIAL

## 2020-11-25 ENCOUNTER — TELEPHONE (OUTPATIENT)
Dept: FAMILY MEDICINE CLINIC | Facility: CLINIC | Age: 45
End: 2020-11-25

## 2020-11-25 VITALS
HEART RATE: 76 BPM | WEIGHT: 205 LBS | OXYGEN SATURATION: 96 % | BODY MASS INDEX: 31.07 KG/M2 | TEMPERATURE: 97.3 F | HEIGHT: 68 IN

## 2020-11-25 DIAGNOSIS — R05.9 COUGH: ICD-10-CM

## 2020-11-25 DIAGNOSIS — R68.83 CHILLS: ICD-10-CM

## 2020-11-25 DIAGNOSIS — R53.83 FATIGUE, UNSPECIFIED TYPE: ICD-10-CM

## 2020-11-25 DIAGNOSIS — J02.9 PHARYNGITIS, UNSPECIFIED ETIOLOGY: ICD-10-CM

## 2020-11-25 DIAGNOSIS — R06.00 DOE (DYSPNEA ON EXERTION): ICD-10-CM

## 2020-11-25 DIAGNOSIS — Z20.822 CLOSE EXPOSURE TO COVID-19 VIRUS: Primary | ICD-10-CM

## 2020-11-25 DIAGNOSIS — R51.9 ACUTE NONINTRACTABLE HEADACHE, UNSPECIFIED HEADACHE TYPE: ICD-10-CM

## 2020-11-25 DIAGNOSIS — R43.2 TASTE SENSE ALTERED: ICD-10-CM

## 2020-11-25 PROCEDURE — 1036F TOBACCO NON-USER: CPT | Performed by: FAMILY MEDICINE

## 2020-11-25 PROCEDURE — 99214 OFFICE O/P EST MOD 30 MIN: CPT | Performed by: FAMILY MEDICINE

## 2020-11-25 RX ORDER — ALBUTEROL SULFATE 90 UG/1
2 AEROSOL, METERED RESPIRATORY (INHALATION) EVERY 4 HOURS PRN
Qty: 1 INHALER | Refills: 0 | Status: SHIPPED | OUTPATIENT
Start: 2020-11-25 | End: 2020-12-05

## 2020-11-25 RX ORDER — BENZONATATE 100 MG/1
100 CAPSULE ORAL 3 TIMES DAILY PRN
Qty: 30 CAPSULE | Refills: 0 | Status: SHIPPED | OUTPATIENT
Start: 2020-11-25 | End: 2020-12-05

## 2020-11-30 ENCOUNTER — TELEMEDICINE (OUTPATIENT)
Dept: FAMILY MEDICINE CLINIC | Facility: CLINIC | Age: 45
End: 2020-11-30
Payer: COMMERCIAL

## 2020-11-30 ENCOUNTER — TELEPHONE (OUTPATIENT)
Dept: FAMILY MEDICINE CLINIC | Facility: CLINIC | Age: 45
End: 2020-11-30

## 2020-11-30 VITALS
HEIGHT: 68 IN | HEART RATE: 86 BPM | TEMPERATURE: 97.3 F | BODY MASS INDEX: 31.07 KG/M2 | OXYGEN SATURATION: 97 % | WEIGHT: 205 LBS

## 2020-11-30 DIAGNOSIS — U07.1 COVID-19: Primary | ICD-10-CM

## 2020-11-30 PROCEDURE — 99213 OFFICE O/P EST LOW 20 MIN: CPT | Performed by: FAMILY MEDICINE

## 2020-11-30 PROCEDURE — 3008F BODY MASS INDEX DOCD: CPT | Performed by: FAMILY MEDICINE

## 2020-12-02 DIAGNOSIS — N92.1 EXCESSIVE AND FREQUENT MENSTRUATION WITH IRREGULAR CYCLE: ICD-10-CM

## 2020-12-02 RX ORDER — NORGESTIMATE AND ETHINYL ESTRADIOL
1 KIT DAILY
Qty: 84 TABLET | Refills: 0 | Status: SHIPPED | OUTPATIENT
Start: 2020-12-02 | End: 2021-03-17 | Stop reason: SDUPTHER

## 2020-12-15 DIAGNOSIS — R05.9 COUGH: ICD-10-CM

## 2020-12-15 DIAGNOSIS — R06.00 DOE (DYSPNEA ON EXERTION): ICD-10-CM

## 2020-12-15 RX ORDER — ALBUTEROL SULFATE 90 UG/1
AEROSOL, METERED RESPIRATORY (INHALATION)
Qty: 6.7 INHALER | OUTPATIENT
Start: 2020-12-15

## 2021-03-09 ENCOUNTER — TELEPHONE (OUTPATIENT)
Dept: FAMILY MEDICINE CLINIC | Facility: CLINIC | Age: 46
End: 2021-03-09

## 2021-03-09 NOTE — TELEPHONE ENCOUNTER
----- Message from Moises Lee DO sent at 3/7/2021 10:03 AM EST -----  Patient self scheduled but there is no reason why - please investigate

## 2021-03-17 DIAGNOSIS — N92.1 EXCESSIVE AND FREQUENT MENSTRUATION WITH IRREGULAR CYCLE: ICD-10-CM

## 2021-03-17 RX ORDER — NORGESTIMATE AND ETHINYL ESTRADIOL
1 KIT DAILY
Qty: 28 TABLET | Refills: 1 | Status: SHIPPED | OUTPATIENT
Start: 2021-03-17 | End: 2021-04-08

## 2021-03-17 NOTE — TELEPHONE ENCOUNTER
Pt has yearly appt sched for 4/22/2021 - please sign off on presc rf for Previfem x 2 months to [facility])

## 2021-03-22 ENCOUNTER — LAB (OUTPATIENT)
Dept: LAB | Age: 46
End: 2021-03-22
Payer: COMMERCIAL

## 2021-03-22 ENCOUNTER — HOSPITAL ENCOUNTER (OUTPATIENT)
Dept: RADIOLOGY | Age: 46
Discharge: HOME | End: 2021-03-22
Payer: COMMERCIAL

## 2021-03-22 VITALS — HEIGHT: 68 IN | BODY MASS INDEX: 30.92 KG/M2 | WEIGHT: 204 LBS

## 2021-03-22 DIAGNOSIS — N92.0 MENORRHAGIA WITH REGULAR CYCLE: ICD-10-CM

## 2021-03-22 DIAGNOSIS — Z11.4 SCREENING FOR HIV (HUMAN IMMUNODEFICIENCY VIRUS): ICD-10-CM

## 2021-03-22 DIAGNOSIS — E55.9 VITAMIN D DEFICIENCY: ICD-10-CM

## 2021-03-22 DIAGNOSIS — D64.9 ANEMIA, UNSPECIFIED TYPE: ICD-10-CM

## 2021-03-22 DIAGNOSIS — G43.009 MIGRAINE WITHOUT AURA AND WITHOUT STATUS MIGRAINOSUS, NOT INTRACTABLE: ICD-10-CM

## 2021-03-22 DIAGNOSIS — Z12.31 ENCOUNTER FOR SCREENING MAMMOGRAM FOR MALIGNANT NEOPLASM OF BREAST: ICD-10-CM

## 2021-03-22 LAB
25(OH)D3 SERPL-MCNC: 15.8 NG/ML (ref 30–100)
ALBUMIN SERPL BCP-MCNC: 3.6 G/DL (ref 3.5–5)
ALP SERPL-CCNC: 78 U/L (ref 46–116)
ALT SERPL W P-5'-P-CCNC: 26 U/L (ref 12–78)
ANION GAP SERPL CALCULATED.3IONS-SCNC: 4 MMOL/L (ref 4–13)
AST SERPL W P-5'-P-CCNC: 17 U/L (ref 5–45)
BASOPHILS # BLD AUTO: 0.05 THOUSANDS/ΜL (ref 0–0.1)
BASOPHILS NFR BLD AUTO: 1 % (ref 0–1)
BILIRUB SERPL-MCNC: 0.36 MG/DL (ref 0.2–1)
BUN SERPL-MCNC: 14 MG/DL (ref 5–25)
CALCIUM SERPL-MCNC: 9.2 MG/DL (ref 8.3–10.1)
CHLORIDE SERPL-SCNC: 109 MMOL/L (ref 100–108)
CHOLEST SERPL-MCNC: 274 MG/DL (ref 50–200)
CO2 SERPL-SCNC: 27 MMOL/L (ref 21–32)
CREAT SERPL-MCNC: 1.02 MG/DL (ref 0.6–1.3)
EOSINOPHIL # BLD AUTO: 0.06 THOUSAND/ΜL (ref 0–0.61)
EOSINOPHIL NFR BLD AUTO: 1 % (ref 0–6)
ERYTHROCYTE [DISTWIDTH] IN BLOOD BY AUTOMATED COUNT: 13.2 % (ref 11.6–15.1)
GFR SERPL CREATININE-BSD FRML MDRD: 65 ML/MIN/1.73SQ M
GLUCOSE P FAST SERPL-MCNC: 83 MG/DL (ref 65–99)
HCT VFR BLD AUTO: 35.7 % (ref 34.8–46.1)
HDLC SERPL-MCNC: 71 MG/DL
HGB BLD-MCNC: 11.6 G/DL (ref 11.5–15.4)
IMM GRANULOCYTES # BLD AUTO: 0.02 THOUSAND/UL (ref 0–0.2)
IMM GRANULOCYTES NFR BLD AUTO: 0 % (ref 0–2)
LDLC SERPL CALC-MCNC: 170 MG/DL (ref 0–100)
LYMPHOCYTES # BLD AUTO: 1.63 THOUSANDS/ΜL (ref 0.6–4.47)
LYMPHOCYTES NFR BLD AUTO: 30 % (ref 14–44)
MCH RBC QN AUTO: 29.1 PG (ref 26.8–34.3)
MCHC RBC AUTO-ENTMCNC: 32.5 G/DL (ref 31.4–37.4)
MCV RBC AUTO: 90 FL (ref 82–98)
MONOCYTES # BLD AUTO: 0.44 THOUSAND/ΜL (ref 0.17–1.22)
MONOCYTES NFR BLD AUTO: 8 % (ref 4–12)
NEUTROPHILS # BLD AUTO: 3.23 THOUSANDS/ΜL (ref 1.85–7.62)
NEUTS SEG NFR BLD AUTO: 60 % (ref 43–75)
NONHDLC SERPL-MCNC: 203 MG/DL
NRBC BLD AUTO-RTO: 0 /100 WBCS
PLATELET # BLD AUTO: 226 THOUSANDS/UL (ref 149–390)
PMV BLD AUTO: 12.3 FL (ref 8.9–12.7)
POTASSIUM SERPL-SCNC: 4.4 MMOL/L (ref 3.5–5.3)
PROT SERPL-MCNC: 7.4 G/DL (ref 6.4–8.2)
RBC # BLD AUTO: 3.98 MILLION/UL (ref 3.81–5.12)
SODIUM SERPL-SCNC: 140 MMOL/L (ref 136–145)
TRIGL SERPL-MCNC: 167 MG/DL
TSH SERPL DL<=0.05 MIU/L-ACNC: 1.73 UIU/ML (ref 0.36–3.74)
WBC # BLD AUTO: 5.43 THOUSAND/UL (ref 4.31–10.16)

## 2021-03-22 PROCEDURE — 87389 HIV-1 AG W/HIV-1&-2 AB AG IA: CPT

## 2021-03-22 PROCEDURE — 80061 LIPID PANEL: CPT

## 2021-03-22 PROCEDURE — 36415 COLL VENOUS BLD VENIPUNCTURE: CPT

## 2021-03-22 PROCEDURE — 82306 VITAMIN D 25 HYDROXY: CPT

## 2021-03-22 PROCEDURE — 84443 ASSAY THYROID STIM HORMONE: CPT

## 2021-03-22 PROCEDURE — 85025 COMPLETE CBC W/AUTO DIFF WBC: CPT

## 2021-03-22 PROCEDURE — 77063 BREAST TOMOSYNTHESIS BI: CPT

## 2021-03-22 PROCEDURE — 77067 SCR MAMMO BI INCL CAD: CPT

## 2021-03-22 PROCEDURE — 80053 COMPREHEN METABOLIC PANEL: CPT

## 2021-03-22 NOTE — PROGRESS NOTES
Assessment/Plan:     1  Well adult exam  See below     2  Migraine without aura and without status migrainosus, not intractable  Well controlled  3  Irritable bowel syndrome with both constipation and diarrhea  Stable     4  Palpitations  No concerns     5  Vitamin D deficiency  New  Prescription:  - ergocalciferol (VITAMIN D2) 50,000 units; Take 1 capsule (50,000 Units total) by mouth once a week for 12 doses  Dispense: 12 capsule; Refill: 0  - Vitamin D 25 hydroxy; Future  Your vitamin D level is low  This is not a vitamin that is well absorbed, it cannot be found in high doses in most foods, and sun exposure is not enough to correct the level  For this you should start an prescription for vitamin D 50,000 iu ONCE A WEEK for 12 weeks  This will not cure your low level, but it will help get it back to normal  After you complete this prescription you should repeat the blood work (you do not need to fast)  With that level we can tell you how much over-the-counter dosing you should take in order to maintain this level  6  Pure hypercholesterolemia  Worsened but ratio healthy  Reassured pt  Continue with plant based diet and exercise, update labs in 6 months  The 10-year ASCVD risk score (Ioana Murdock et al , 2013) is: 0 9%    Values used to calculate the score:      Age: 48 years      Sex: Female      Is Non- : No      Diabetic: No      Tobacco smoker: No      Systolic Blood Pressure: 205 mmHg      Is BP treated: No      HDL Cholesterol: 71 mg/dL      Total Cholesterol: 274 mg/dL  - Comprehensive metabolic panel; Future  - Lipid panel; Future    7  Class 1 obesity due to excess calories with serious comorbidity and body mass index (BMI) of 32 0 to 32 9 in adult  Encouraged diet and exercise to help for a healthier BMI  Recommend COVID vaccine  Well adult exam  ·         Continue healthy diet   ·         Encourage exercise 4 times a week or more for minimum 30 minutes     · Continue to see dentist, wear seatbelt  ·         Health maintenance reviewed and up-to-date - recommend COVID vaccine  Reviewed age appropriate health maintenance screenings and immunizations that are due, risks and benefits of these  Health Maintenance   Topic Date Due    COVID-19 Vaccine (1) Never done    Annual Physical  11/26/2020    BMI: Followup Plan  11/25/2021    MAMMOGRAM  03/22/2022    Depression Screening PHQ  03/26/2022    BMI: Adult  03/26/2022    Cervical Cancer Screening  10/09/2023    DTaP,Tdap,and Td Vaccines (2 - Td) 09/12/2029    HIV Screening  Completed    Influenza Vaccine  Completed    Pneumococcal Vaccine: Pediatrics (0 to 5 Years) and At-Risk Patients (6 to 59 Years)  Aged Out    HIB Vaccine  Aged Out    Hepatitis B Vaccine  Aged Out    IPV Vaccine  Aged Out    Hepatitis A Vaccine  Aged Out    Meningococcal ACWY Vaccine  Aged Out    HPV Vaccine  Aged Out     Return in about 1 year (around 3/26/2022) for Annual physical     Subjective:    RAFY Monroe is a 48 y o  female who presents today for a physical      Chief Complaint   Patient presents with    Physical Exam     PHQ-9 Depression Screening    PHQ-9:   Frequency of the following problems over the past two weeks:      Little interest or pleasure in doing things: 0 - not at all  Feeling down, depressed, or hopeless: 0 - not at all  PHQ-2 Score: 0        ---Above per clinical staff & reviewed   ---  Patient here today for a physical:    Diet: healthy - on a healthy plan and doing well   Exercise:  Yes   Dental visits:  Yes   Seatbelt: yes    Concerns today:  Nov started - COVID 2 -3 weeks later - restarted January   Still tired- wondering about B vitamin   Concerned about labs since she is eating well   No recent cold sores - usually in winter         The following portions of the patient's history were reviewed and updated as appropriate: allergies, current medications, past family history, past medical history, past social history, past surgical history and problem list      Current Outpatient Medications   Medication Sig Dispense Refill    Previfem 0 25-35 MG-MCG per tablet Take 1 tablet by mouth daily 28 tablet 1    valACYclovir (VALTREX) 1,000 mg tablet TAKE TWO TABS BY MOUTH AT ONSET, THEN TWO TABS 12 HRS  LATER  3    ergocalciferol (VITAMIN D2) 50,000 units Take 1 capsule (50,000 Units total) by mouth once a week for 12 doses 12 capsule 0     No current facility-administered medications for this visit  Objective:      /82   Pulse 96   Temp 97 8 °F (36 6 °C)   Resp 20   Ht 5' 7 05" (1 703 m)   Wt 93 kg (205 lb)   LMP 03/17/2021   SpO2 99%   BMI 32 06 kg/m²   BP Readings from Last 3 Encounters:   03/26/21 110/82   11/26/19 122/80   09/12/19 110/80     Wt Readings from Last 3 Encounters:   03/26/21 93 kg (205 lb)   03/22/21 92 5 kg (204 lb)   11/30/20 93 kg (205 lb)       Review of Systems  ROS:  all others negative - no chest pain, SOB, normal urine and bowels  no GERD  sleeping well  mood good  Physical Exam   Constitutional: she appears well-developed and well-nourished  HENT: Head: Normocephalic  Right Ear: External ear normal  Tympanic membrane normal    Left Ear: External ear normal  Tympanic membrane normal    Nose: Nose normal  No mucosal edema, No rhinorrhea  Right sinus exhibits no maxillary sinus tenderness  Left sinus exhibits no maxillary sinus tenderness  Mouth/Throat: Oropharynx is clear and moist    Eyes: Normal conjunctiva  No erythema  No discharge  Neck: No pain on exam  Neck supple  Cardiovascular: Normal rate, regular rhythm and normal heart sounds  Pulmonary/Chest: Effort normal and breath sounds normal  No wheezes  No rales  No rhonchi  Abdominal: Soft  Bowel sounds are normal  There is no tenderness  Musculoskeletal: she exhibits no edema  Lymphadenopathy: she has no cervical adenopathy     Neurological: she  is alert and oriented to person, place, and time  Skin: Skin is warm and dry  No rashes  Psychiatric: she  has a normal mood and affect  her behavior is normal  Thought content normal    Vitals reviewed  BMI Counseling: Body mass index is 32 06 kg/m²  The BMI is above normal  Nutrition recommendations include decreasing portion sizes  Exercise recommendations include exercising 3-5 times per week

## 2021-03-23 LAB — HIV 1+2 AB+HIV1 P24 AG SERPL QL IA: NORMAL

## 2021-03-26 ENCOUNTER — OFFICE VISIT (OUTPATIENT)
Dept: FAMILY MEDICINE CLINIC | Facility: CLINIC | Age: 46
End: 2021-03-26
Payer: COMMERCIAL

## 2021-03-26 VITALS
RESPIRATION RATE: 20 BRPM | TEMPERATURE: 97.8 F | BODY MASS INDEX: 32.18 KG/M2 | HEART RATE: 96 BPM | SYSTOLIC BLOOD PRESSURE: 110 MMHG | WEIGHT: 205 LBS | DIASTOLIC BLOOD PRESSURE: 82 MMHG | OXYGEN SATURATION: 99 % | HEIGHT: 67 IN

## 2021-03-26 DIAGNOSIS — E55.9 VITAMIN D DEFICIENCY: ICD-10-CM

## 2021-03-26 DIAGNOSIS — E78.00 PURE HYPERCHOLESTEROLEMIA: ICD-10-CM

## 2021-03-26 DIAGNOSIS — K58.2 IRRITABLE BOWEL SYNDROME WITH BOTH CONSTIPATION AND DIARRHEA: ICD-10-CM

## 2021-03-26 DIAGNOSIS — Z00.00 WELL ADULT EXAM: Primary | ICD-10-CM

## 2021-03-26 DIAGNOSIS — E66.09 CLASS 1 OBESITY DUE TO EXCESS CALORIES WITH SERIOUS COMORBIDITY AND BODY MASS INDEX (BMI) OF 32.0 TO 32.9 IN ADULT: ICD-10-CM

## 2021-03-26 DIAGNOSIS — R00.2 PALPITATIONS: ICD-10-CM

## 2021-03-26 DIAGNOSIS — G43.009 MIGRAINE WITHOUT AURA AND WITHOUT STATUS MIGRAINOSUS, NOT INTRACTABLE: ICD-10-CM

## 2021-03-26 PROBLEM — E66.811 CLASS 1 OBESITY DUE TO EXCESS CALORIES WITH SERIOUS COMORBIDITY AND BODY MASS INDEX (BMI) OF 32.0 TO 32.9 IN ADULT: Status: ACTIVE | Noted: 2021-03-26

## 2021-03-26 PROCEDURE — 3725F SCREEN DEPRESSION PERFORMED: CPT | Performed by: FAMILY MEDICINE

## 2021-03-26 PROCEDURE — 3008F BODY MASS INDEX DOCD: CPT | Performed by: FAMILY MEDICINE

## 2021-03-26 PROCEDURE — 1036F TOBACCO NON-USER: CPT | Performed by: FAMILY MEDICINE

## 2021-03-26 PROCEDURE — 99396 PREV VISIT EST AGE 40-64: CPT | Performed by: FAMILY MEDICINE

## 2021-03-26 RX ORDER — ERGOCALCIFEROL 1.25 MG/1
50000 CAPSULE ORAL WEEKLY
Qty: 12 CAPSULE | Refills: 0 | Status: SHIPPED | OUTPATIENT
Start: 2021-03-26 | End: 2022-06-13

## 2021-03-26 NOTE — PATIENT INSTRUCTIONS
Calcium 1200 mg daily w/D  Your vitamin D level is low  This is not a vitamin that is well absorbed, it cannot be found in high doses in most foods, and sun exposure is not enough to correct the level  For this you should start an prescription for vitamin D 50,000 iu ONCE A WEEK for 12 weeks  This will not cure your low level, but it will help get it back to normal  After you complete this prescription you should repeat the blood work (you do not need to fast)  With that level we can tell you how much over-the-counter dosing you should take in order to maintain this level

## 2021-04-02 ENCOUNTER — IMMUNIZATIONS (OUTPATIENT)
Dept: FAMILY MEDICINE CLINIC | Facility: HOSPITAL | Age: 46
End: 2021-04-02

## 2021-04-02 DIAGNOSIS — Z23 ENCOUNTER FOR IMMUNIZATION: Primary | ICD-10-CM

## 2021-04-02 PROCEDURE — 91300 SARS-COV-2 / COVID-19 MRNA VACCINE (PFIZER-BIONTECH) 30 MCG: CPT

## 2021-04-02 PROCEDURE — 0001A SARS-COV-2 / COVID-19 MRNA VACCINE (PFIZER-BIONTECH) 30 MCG: CPT

## 2021-04-08 DIAGNOSIS — N92.1 EXCESSIVE AND FREQUENT MENSTRUATION WITH IRREGULAR CYCLE: ICD-10-CM

## 2021-04-08 RX ORDER — NORGESTIMATE AND ETHINYL ESTRADIOL 0.25-0.035
KIT ORAL
Qty: 28 TABLET | Refills: 1 | Status: SHIPPED | OUTPATIENT
Start: 2021-04-08 | End: 2021-04-22 | Stop reason: SDUPTHER

## 2021-04-22 ENCOUNTER — ANNUAL EXAM (OUTPATIENT)
Dept: OBGYN CLINIC | Facility: CLINIC | Age: 46
End: 2021-04-22
Payer: COMMERCIAL

## 2021-04-22 VITALS
BODY MASS INDEX: 32.18 KG/M2 | HEIGHT: 67 IN | SYSTOLIC BLOOD PRESSURE: 112 MMHG | WEIGHT: 205 LBS | DIASTOLIC BLOOD PRESSURE: 76 MMHG

## 2021-04-22 DIAGNOSIS — Z12.39 ENCOUNTER FOR SCREENING FOR MALIGNANT NEOPLASM OF BREAST, UNSPECIFIED SCREENING MODALITY: ICD-10-CM

## 2021-04-22 DIAGNOSIS — Z01.419 ENCOUNTER FOR ANNUAL ROUTINE GYNECOLOGICAL EXAMINATION: Primary | ICD-10-CM

## 2021-04-22 DIAGNOSIS — N92.1 EXCESSIVE AND FREQUENT MENSTRUATION WITH IRREGULAR CYCLE: ICD-10-CM

## 2021-04-22 PROCEDURE — 3008F BODY MASS INDEX DOCD: CPT | Performed by: FAMILY MEDICINE

## 2021-04-22 PROCEDURE — G0145 SCR C/V CYTO,THINLAYER,RESCR: HCPCS | Performed by: OBSTETRICS & GYNECOLOGY

## 2021-04-22 PROCEDURE — S0612 ANNUAL GYNECOLOGICAL EXAMINA: HCPCS | Performed by: OBSTETRICS & GYNECOLOGY

## 2021-04-22 PROCEDURE — 87624 HPV HI-RISK TYP POOLED RSLT: CPT | Performed by: OBSTETRICS & GYNECOLOGY

## 2021-04-22 RX ORDER — NORGESTIMATE AND ETHINYL ESTRADIOL 0.25-0.035
1 KIT ORAL DAILY
Qty: 84 TABLET | Refills: 3 | Status: SHIPPED | OUTPATIENT
Start: 2021-04-22 | End: 2021-05-04 | Stop reason: SDUPTHER

## 2021-04-22 NOTE — PROGRESS NOTES
Assessment/Plan:   Pap smear done as well as annual   Encouraged self-breast examination as well as calcium supplementation  Continue annual mammogram   Continue OCPs x1 year  She will continue to follow-up with her family physician for hypercholesterolemia as scheduled  Return to office in 1 year or p r n  No problem-specific Assessment & Plan notes found for this encounter  Diagnoses and all orders for this visit:    Encounter for annual routine gynecological examination    Encounter for screening for malignant neoplasm of breast, unspecified screening modality  -     Mammo screening bilateral w 3d & cad; Future    Excessive and frequent menstruation with irregular cycle  -     [facility] 0 25-35 MG-MCG per tablet; Take 1 tablet by mouth daily          Subjective:      Patient ID: Obdulia Toribio is a 48 y o  female  HPI      this is a pleasant 40-year-old female  ( x1, age 13) presents for her annual gyn exam   She states her menstrual cycles are regular every 4 weeks lasting 5 days with no breakthrough bleeding  Her menstrual cycles have decreased since she started birth control pills  She denies any changes in bowel or bladder function  She is sexually active and has been in a monogamous relationship with her  for over 18 years  Her Pap smears have been normal   She does have a history of primary infertility, male factor, conceived through IVF  She does follow up with her family physician on a regular basis for hypercholesterolemia managed conservatively  The following portions of the patient's history were reviewed and updated as appropriate: allergies, current medications, past family history, past medical history, past social history, past surgical history and problem list     Review of Systems   Constitutional: Negative for fatigue, fever and unexpected weight change  Respiratory: Negative for cough, chest tightness, shortness of breath and wheezing      Cardiovascular: Negative  Negative for chest pain and palpitations  Gastrointestinal: Negative  Negative for abdominal distention, abdominal pain, blood in stool, constipation, diarrhea, nausea and vomiting  Genitourinary: Negative  Negative for difficulty urinating, dyspareunia, dysuria, flank pain, frequency, genital sores, hematuria, pelvic pain, urgency, vaginal bleeding, vaginal discharge and vaginal pain  Skin: Negative for rash  Objective:      /76   Ht 5' 7 05" (1 703 m)   Wt 93 kg (205 lb)   LMP 04/07/2021   BMI 32 06 kg/m²          Physical Exam  Constitutional:       Appearance: Normal appearance  She is well-developed  Cardiovascular:      Rate and Rhythm: Normal rate and regular rhythm  Pulmonary:      Effort: Pulmonary effort is normal       Breath sounds: Normal breath sounds  Chest:      Breasts:         Right: No inverted nipple, mass, nipple discharge, skin change or tenderness  Left: No inverted nipple, mass, nipple discharge, skin change or tenderness  Abdominal:      General: Bowel sounds are normal  There is no distension  Palpations: Abdomen is soft  Tenderness: There is no abdominal tenderness  There is no guarding or rebound  Genitourinary:     Labia:         Right: No rash, tenderness or lesion  Left: No rash, tenderness or lesion  Vagina: Normal  No signs of injury  No vaginal discharge, tenderness or lesions  Cervix: No cervical motion tenderness, discharge, friability, lesion, erythema or cervical bleeding  Uterus: Not enlarged and not tender  Adnexa:         Right: No mass, tenderness or fullness  Left: No mass, tenderness or fullness  Skin:     General: Skin is warm and dry  Neurological:      Mental Status: She is alert and oriented to person, place, and time

## 2021-04-23 ENCOUNTER — IMMUNIZATIONS (OUTPATIENT)
Dept: FAMILY MEDICINE CLINIC | Facility: HOSPITAL | Age: 46
End: 2021-04-23

## 2021-04-23 DIAGNOSIS — Z23 ENCOUNTER FOR IMMUNIZATION: Primary | ICD-10-CM

## 2021-04-23 PROCEDURE — 91300 SARS-COV-2 / COVID-19 MRNA VACCINE (PFIZER-BIONTECH) 30 MCG: CPT

## 2021-04-23 PROCEDURE — 0002A SARS-COV-2 / COVID-19 MRNA VACCINE (PFIZER-BIONTECH) 30 MCG: CPT

## 2021-04-27 LAB
HPV HR 12 DNA CVX QL NAA+PROBE: NEGATIVE
HPV16 DNA CVX QL NAA+PROBE: NEGATIVE
HPV18 DNA CVX QL NAA+PROBE: NEGATIVE

## 2021-04-28 LAB
LAB AP GYN PRIMARY INTERPRETATION: NORMAL
LAB AP LMP: NORMAL
Lab: NORMAL

## 2021-05-04 DIAGNOSIS — N92.1 EXCESSIVE AND FREQUENT MENSTRUATION WITH IRREGULAR CYCLE: ICD-10-CM

## 2021-05-04 RX ORDER — NORGESTIMATE AND ETHINYL ESTRADIOL 0.25-0.035
1 KIT ORAL DAILY
Qty: 84 TABLET | Refills: 3 | Status: SHIPPED | OUTPATIENT
Start: 2021-05-04 | End: 2021-08-02 | Stop reason: SDUPTHER

## 2021-06-11 DIAGNOSIS — E55.9 VITAMIN D DEFICIENCY: ICD-10-CM

## 2021-06-11 RX ORDER — ERGOCALCIFEROL 1.25 MG/1
50000 CAPSULE ORAL WEEKLY
Qty: 12 CAPSULE | Refills: 0 | OUTPATIENT
Start: 2021-06-11 | End: 2021-08-28

## 2021-06-11 NOTE — TELEPHONE ENCOUNTER
Medication refill received from [facility]  Please let the patient know that we do not accept refills directly from the pharmacy

## 2021-07-02 ENCOUNTER — APPOINTMENT (OUTPATIENT)
Dept: LAB | Facility: CLINIC | Age: 46
End: 2021-07-02
Payer: COMMERCIAL

## 2021-07-02 DIAGNOSIS — E55.9 VITAMIN D DEFICIENCY: ICD-10-CM

## 2021-07-02 LAB — 25(OH)D3 SERPL-MCNC: 55.2 NG/ML (ref 30–100)

## 2021-07-02 PROCEDURE — 82306 VITAMIN D 25 HYDROXY: CPT

## 2021-07-02 PROCEDURE — 36415 COLL VENOUS BLD VENIPUNCTURE: CPT

## 2021-08-02 DIAGNOSIS — N92.1 EXCESSIVE AND FREQUENT MENSTRUATION WITH IRREGULAR CYCLE: ICD-10-CM

## 2021-08-02 RX ORDER — NORGESTIMATE AND ETHINYL ESTRADIOL 0.25-0.035
1 KIT ORAL DAILY
Qty: 28 TABLET | Refills: 0 | Status: SHIPPED | OUTPATIENT
Start: 2021-08-02 | End: 2021-08-17 | Stop reason: SDUPTHER

## 2021-08-02 NOTE — TELEPHONE ENCOUNTER
Pt req 1 month rf on ocp ([facility]) as she forgot pill pack at home & is on vacation    Please sign off on presc x 1 month to [facility])

## 2021-08-17 DIAGNOSIS — N92.1 EXCESSIVE AND FREQUENT MENSTRUATION WITH IRREGULAR CYCLE: ICD-10-CM

## 2021-08-17 NOTE — TELEPHONE ENCOUNTER
Fax came from pharmacy needing new script for birth control other one was discontinued  Was here for yearly 04/2021

## 2021-08-18 RX ORDER — NORGESTIMATE AND ETHINYL ESTRADIOL 0.25-0.035
1 KIT ORAL DAILY
Qty: 84 TABLET | Refills: 2 | Status: SHIPPED | OUTPATIENT
Start: 2021-08-18 | End: 2021-08-30 | Stop reason: SDUPTHER

## 2021-08-30 ENCOUNTER — TELEPHONE (OUTPATIENT)
Dept: OBGYN CLINIC | Facility: CLINIC | Age: 46
End: 2021-08-30

## 2021-08-30 DIAGNOSIS — N92.1 EXCESSIVE AND FREQUENT MENSTRUATION WITH IRREGULAR CYCLE: ICD-10-CM

## 2021-08-30 RX ORDER — NORGESTIMATE AND ETHINYL ESTRADIOL 0.25-0.035
1 KIT ORAL DAILY
Qty: 28 TABLET | Refills: 8 | Status: SHIPPED | OUTPATIENT
Start: 2021-08-30 | End: 2022-05-09 | Stop reason: SDUPTHER

## 2021-08-30 NOTE — TELEPHONE ENCOUNTER
----- Message from Janet Underwood sent at 8/30/2021  8:53 AM EDT -----  Regarding: Prescription Question  Contact: [phone]  [facility] has been challenged to fill my birth control prescription via mail order  I'm unclear if the problem is basic supply chain issues or I was told one of the generics, Rina, is no longer being produced  At this point, I've canceled the backorders of my prescription with [facility], and I'd like to go back to the regular 30 day script that I will  [facility] [address]  Locally, they always seem to have an alternate generic if the branded pill is unavailable  If you agree, please submit the script with refills to [facility]   I will need to start the next pack next Sunday, Sept 5      Thanks for your assistance,  Jason Montero

## 2021-08-30 NOTE — TELEPHONE ENCOUNTER
Pt's presc to [facility] to [facility] mail order was [facility] - pt states she does not have issue with getting another generic - prefers to get presc @ retail pharm  Please sign off on presc to [facility] - pt here for yearly 4/2021

## 2021-11-25 ENCOUNTER — PATIENT MESSAGE (OUTPATIENT)
Dept: FAMILY MEDICINE CLINIC | Facility: CLINIC | Age: 46
End: 2021-11-25

## 2021-11-25 DIAGNOSIS — B00.1 RECURRENT COLD SORES: Primary | ICD-10-CM

## 2021-11-26 RX ORDER — VALACYCLOVIR HYDROCHLORIDE 1 G/1
1000 TABLET, FILM COATED ORAL AS NEEDED
Qty: 30 TABLET | Refills: 1 | Status: SHIPPED | OUTPATIENT
Start: 2021-11-26 | End: 2022-03-01

## 2022-03-01 ENCOUNTER — TELEPHONE (OUTPATIENT)
Dept: FAMILY MEDICINE CLINIC | Facility: CLINIC | Age: 47
End: 2022-03-01

## 2022-03-01 DIAGNOSIS — B00.1 RECURRENT COLD SORES: ICD-10-CM

## 2022-03-01 RX ORDER — VALACYCLOVIR HYDROCHLORIDE 1 G/1
TABLET, FILM COATED ORAL
Qty: 12 TABLET | Refills: 0
Start: 2022-03-01 | End: 2022-06-13 | Stop reason: SDUPTHER

## 2022-03-01 NOTE — TELEPHONE ENCOUNTER
Reviewed patients last office note on 3/26/21:  Medication list shows:  valACYclovir (VALTREX) 1,000 mg tablet (Taking/Discontinued)  3 8/4/2019 11/26/2021   Sig: TAKE TWO TABS BY MOUTH AT ONSET, THEN TWO TABS 12 HRS  LATER   Class: Historical Med   Reason for Discontinue: Reorder       There was no changes to the Valtrex after that office visit  Replied to patients Issuehart message advising to take   TAKE TWO TABS BY MOUTH AT ONSET, THEN TWO TABS 12 HRS  LATER as she has in the past      Will route to Dr Bon Viramontes to confirm and sign off of note  Updated medication instructions

## 2022-03-01 NOTE — TELEPHONE ENCOUNTER
----- Message from Jonathan Cope sent at 3/1/2022  1:41 PM EST -----  Regarding: Valacyclovir  I should've mentioned   i need to take a dose NOW!

## 2022-03-24 ENCOUNTER — HOSPITAL ENCOUNTER (OUTPATIENT)
Dept: RADIOLOGY | Age: 47
Discharge: HOME | End: 2022-03-24
Payer: COMMERCIAL

## 2022-03-24 VITALS — BODY MASS INDEX: 32.58 KG/M2 | WEIGHT: 215 LBS | HEIGHT: 68 IN

## 2022-03-24 DIAGNOSIS — Z12.31 ENCOUNTER FOR SCREENING MAMMOGRAM FOR MALIGNANT NEOPLASM OF BREAST: ICD-10-CM

## 2022-03-24 DIAGNOSIS — Z12.39 ENCOUNTER FOR SCREENING FOR MALIGNANT NEOPLASM OF BREAST, UNSPECIFIED SCREENING MODALITY: ICD-10-CM

## 2022-03-24 PROCEDURE — 77067 SCR MAMMO BI INCL CAD: CPT

## 2022-03-24 PROCEDURE — 77063 BREAST TOMOSYNTHESIS BI: CPT

## 2022-05-08 DIAGNOSIS — N92.1 EXCESSIVE AND FREQUENT MENSTRUATION WITH IRREGULAR CYCLE: ICD-10-CM

## 2022-05-08 RX ORDER — NORGESTIMATE AND ETHINYL ESTRADIOL 0.25-0.035
KIT ORAL
Qty: 84 TABLET | Refills: 2 | OUTPATIENT
Start: 2022-05-08

## 2022-05-09 RX ORDER — NORGESTIMATE AND ETHINYL ESTRADIOL 0.25-0.035
1 KIT ORAL DAILY
Qty: 84 TABLET | Refills: 0 | Status: SHIPPED | OUTPATIENT
Start: 2022-05-09 | End: 2022-08-01

## 2022-05-09 NOTE — TELEPHONE ENCOUNTER
Pt scheduled yearly appt for 7/25/2022    Please sign off on presc for Whitney (3 month) to [facility])

## 2022-06-01 ENCOUNTER — PATIENT MESSAGE (OUTPATIENT)
Dept: FAMILY MEDICINE CLINIC | Facility: CLINIC | Age: 47
End: 2022-06-01

## 2022-06-01 DIAGNOSIS — E78.00 PURE HYPERCHOLESTEROLEMIA: Primary | ICD-10-CM

## 2022-06-01 DIAGNOSIS — E55.9 VITAMIN D DEFICIENCY: ICD-10-CM

## 2022-06-01 DIAGNOSIS — E61.1 LOW IRON: ICD-10-CM

## 2022-06-01 DIAGNOSIS — Z11.59 NEED FOR HEPATITIS C SCREENING TEST: ICD-10-CM

## 2022-06-01 DIAGNOSIS — E53.8 LOW SERUM VITAMIN B12: ICD-10-CM

## 2022-06-01 DIAGNOSIS — R53.83 OTHER FATIGUE: ICD-10-CM

## 2022-06-02 PROBLEM — D64.9 ANEMIA: Status: ACTIVE | Noted: 2022-06-02

## 2022-06-03 NOTE — TELEPHONE ENCOUNTER
Blood work ordered including Hep C as part of recommended one time screening test in all adults if she agrees to this

## 2022-06-06 ENCOUNTER — LAB (OUTPATIENT)
Dept: LAB | Age: 47
End: 2022-06-06
Payer: COMMERCIAL

## 2022-06-06 DIAGNOSIS — E78.00 PURE HYPERCHOLESTEROLEMIA: ICD-10-CM

## 2022-06-06 DIAGNOSIS — E53.8 LOW SERUM VITAMIN B12: ICD-10-CM

## 2022-06-06 DIAGNOSIS — E61.1 LOW IRON: ICD-10-CM

## 2022-06-06 DIAGNOSIS — Z11.59 NEED FOR HEPATITIS C SCREENING TEST: ICD-10-CM

## 2022-06-06 DIAGNOSIS — R53.83 OTHER FATIGUE: ICD-10-CM

## 2022-06-06 DIAGNOSIS — E55.9 VITAMIN D DEFICIENCY: ICD-10-CM

## 2022-06-06 LAB
25(OH)D3 SERPL-MCNC: 34.6 NG/ML (ref 30–100)
ALBUMIN SERPL BCP-MCNC: 3.4 G/DL (ref 3.5–5)
ALP SERPL-CCNC: 70 U/L (ref 46–116)
ALT SERPL W P-5'-P-CCNC: 23 U/L (ref 12–78)
ANION GAP SERPL CALCULATED.3IONS-SCNC: 5 MMOL/L (ref 4–13)
AST SERPL W P-5'-P-CCNC: 16 U/L (ref 5–45)
BASOPHILS # BLD AUTO: 0.03 THOUSANDS/ΜL (ref 0–0.1)
BASOPHILS NFR BLD AUTO: 1 % (ref 0–1)
BILIRUB SERPL-MCNC: 0.3 MG/DL (ref 0.2–1)
BUN SERPL-MCNC: 14 MG/DL (ref 5–25)
CALCIUM ALBUM COR SERPL-MCNC: 9.9 MG/DL (ref 8.3–10.1)
CALCIUM SERPL-MCNC: 9.4 MG/DL (ref 8.3–10.1)
CHLORIDE SERPL-SCNC: 104 MMOL/L (ref 100–108)
CHOLEST SERPL-MCNC: 246 MG/DL
CO2 SERPL-SCNC: 28 MMOL/L (ref 21–32)
CREAT SERPL-MCNC: 0.96 MG/DL (ref 0.6–1.3)
EOSINOPHIL # BLD AUTO: 0.06 THOUSAND/ΜL (ref 0–0.61)
EOSINOPHIL NFR BLD AUTO: 1 % (ref 0–6)
ERYTHROCYTE [DISTWIDTH] IN BLOOD BY AUTOMATED COUNT: 13 % (ref 11.6–15.1)
FERRITIN SERPL-MCNC: 56 NG/ML (ref 8–388)
GFR SERPL CREATININE-BSD FRML MDRD: 69 ML/MIN/1.73SQ M
GLUCOSE P FAST SERPL-MCNC: 89 MG/DL (ref 65–99)
HCT VFR BLD AUTO: 36.9 % (ref 34.8–46.1)
HCV AB SER QL: NORMAL
HDLC SERPL-MCNC: 66 MG/DL
HGB BLD-MCNC: 11.8 G/DL (ref 11.5–15.4)
IMM GRANULOCYTES # BLD AUTO: 0.02 THOUSAND/UL (ref 0–0.2)
IMM GRANULOCYTES NFR BLD AUTO: 0 % (ref 0–2)
IRON SATN MFR SERPL: 22 % (ref 15–50)
IRON SERPL-MCNC: 92 UG/DL (ref 50–170)
LDLC SERPL CALC-MCNC: 147 MG/DL (ref 0–100)
LYMPHOCYTES # BLD AUTO: 1.38 THOUSANDS/ΜL (ref 0.6–4.47)
LYMPHOCYTES NFR BLD AUTO: 26 % (ref 14–44)
MCH RBC QN AUTO: 28.7 PG (ref 26.8–34.3)
MCHC RBC AUTO-ENTMCNC: 32 G/DL (ref 31.4–37.4)
MCV RBC AUTO: 90 FL (ref 82–98)
MONOCYTES # BLD AUTO: 0.47 THOUSAND/ΜL (ref 0.17–1.22)
MONOCYTES NFR BLD AUTO: 9 % (ref 4–12)
NEUTROPHILS # BLD AUTO: 3.37 THOUSANDS/ΜL (ref 1.85–7.62)
NEUTS SEG NFR BLD AUTO: 63 % (ref 43–75)
NONHDLC SERPL-MCNC: 180 MG/DL
NRBC BLD AUTO-RTO: 0 /100 WBCS
PLATELET # BLD AUTO: 207 THOUSANDS/UL (ref 149–390)
PMV BLD AUTO: 12.7 FL (ref 8.9–12.7)
POTASSIUM SERPL-SCNC: 4.7 MMOL/L (ref 3.5–5.3)
PROT SERPL-MCNC: 7.5 G/DL (ref 6.4–8.2)
RBC # BLD AUTO: 4.11 MILLION/UL (ref 3.81–5.12)
SODIUM SERPL-SCNC: 137 MMOL/L (ref 136–145)
TIBC SERPL-MCNC: 413 UG/DL (ref 250–450)
TRIGL SERPL-MCNC: 164 MG/DL
TSH SERPL DL<=0.05 MIU/L-ACNC: 1.9 UIU/ML (ref 0.45–4.5)
VIT B12 SERPL-MCNC: 255 PG/ML (ref 100–900)
WBC # BLD AUTO: 5.33 THOUSAND/UL (ref 4.31–10.16)

## 2022-06-06 PROCEDURE — 82607 VITAMIN B-12: CPT

## 2022-06-06 PROCEDURE — 85025 COMPLETE CBC W/AUTO DIFF WBC: CPT

## 2022-06-06 PROCEDURE — 82728 ASSAY OF FERRITIN: CPT

## 2022-06-06 PROCEDURE — 84443 ASSAY THYROID STIM HORMONE: CPT

## 2022-06-06 PROCEDURE — 83540 ASSAY OF IRON: CPT

## 2022-06-06 PROCEDURE — 36415 COLL VENOUS BLD VENIPUNCTURE: CPT

## 2022-06-06 PROCEDURE — 80053 COMPREHEN METABOLIC PANEL: CPT

## 2022-06-06 PROCEDURE — 86803 HEPATITIS C AB TEST: CPT

## 2022-06-06 PROCEDURE — 82306 VITAMIN D 25 HYDROXY: CPT

## 2022-06-06 PROCEDURE — 83550 IRON BINDING TEST: CPT

## 2022-06-06 PROCEDURE — 80061 LIPID PANEL: CPT

## 2022-06-06 NOTE — RESULT ENCOUNTER NOTE
Call patient to schedule visit for physical and to review these labs   (Reassure her nothing worrisome)

## 2022-06-13 ENCOUNTER — OFFICE VISIT (OUTPATIENT)
Dept: FAMILY MEDICINE CLINIC | Facility: CLINIC | Age: 47
End: 2022-06-13
Payer: COMMERCIAL

## 2022-06-13 VITALS
WEIGHT: 218 LBS | BODY MASS INDEX: 33.04 KG/M2 | SYSTOLIC BLOOD PRESSURE: 116 MMHG | HEART RATE: 86 BPM | DIASTOLIC BLOOD PRESSURE: 70 MMHG | OXYGEN SATURATION: 99 % | RESPIRATION RATE: 16 BRPM | HEIGHT: 68 IN | TEMPERATURE: 97.3 F

## 2022-06-13 DIAGNOSIS — E55.9 VITAMIN D DEFICIENCY: ICD-10-CM

## 2022-06-13 DIAGNOSIS — E53.8 B12 DEFICIENCY: ICD-10-CM

## 2022-06-13 DIAGNOSIS — Z12.11 SCREEN FOR COLON CANCER: ICD-10-CM

## 2022-06-13 DIAGNOSIS — E66.09 CLASS 1 OBESITY DUE TO EXCESS CALORIES WITH SERIOUS COMORBIDITY AND BODY MASS INDEX (BMI) OF 33.0 TO 33.9 IN ADULT: ICD-10-CM

## 2022-06-13 DIAGNOSIS — E78.00 PURE HYPERCHOLESTEROLEMIA: ICD-10-CM

## 2022-06-13 DIAGNOSIS — B00.1 RECURRENT COLD SORES: ICD-10-CM

## 2022-06-13 DIAGNOSIS — Z00.00 WELL ADULT EXAM: Primary | ICD-10-CM

## 2022-06-13 PROCEDURE — 3725F SCREEN DEPRESSION PERFORMED: CPT | Performed by: FAMILY MEDICINE

## 2022-06-13 PROCEDURE — 99396 PREV VISIT EST AGE 40-64: CPT | Performed by: FAMILY MEDICINE

## 2022-06-13 RX ORDER — VALACYCLOVIR HYDROCHLORIDE 1 G/1
TABLET, FILM COATED ORAL
Qty: 12 TABLET | Refills: 0 | Status: SHIPPED | OUTPATIENT
Start: 2022-06-13 | End: 2023-06-13

## 2022-06-13 RX ORDER — CYANOCOBALAMIN (VITAMIN B-12) 500 MCG
500 TABLET ORAL DAILY
Start: 2022-06-13

## 2022-06-13 NOTE — PATIENT INSTRUCTIONS
B12 500 mg daily     How much calcium should you have? Adults 19 - 50: 1,000 mg   Adults over 50:  1, 200 mg      Some calcium rich foods:  ·         Dairy - low fat or fat free milk, soy milk, cheese, cottage cheese, yogurt, ice cream, frozen yogurt  ·         Green leafy vegetables like sravanthi greens kale or mustard greens, [place]  ·         Calcium fortified citrus juices  ·         Sardines and salmon with bones  ·         [place]mond[place] beans, red beans, chickpeas  ·         Tofu  ·         Molasses  ·         Corn tortillas  ·         Seaweed, dry  ·         Almonds     ·         Taking a vitamin D supplement with calcium will help with absorption  ·         Having a lot of caffeine oriented and antiacids can decrease your absorption of calcium

## 2022-06-13 NOTE — PROGRESS NOTES
Assessment/Plan:     1  Well adult exam  See below     2  B12 deficiency  Start over the counter B12 -   - vitamin B-12 (CYANOCOBALAMIN) 500 MCG TABS; Take 1 tablet (500 mcg total) by mouth daily  - Vitamin B12; Future    3  Pure hypercholesterolemia  Well controlled     4  Vitamin D deficiency  Well controlled     5  Recurrent cold sores  Refill   - valACYclovir (VALTREX) 1,000 mg tablet; TAKE TWO TABS BY MOUTH AT ONSET, THEN TWO TABS 12 HRS  LATER  Dispense: 12 tablet; Refill: 0    6  Screen for colon cancer  Refer   - Ambulatory referral for colonoscopy; Future    7  Class 1 obesity due to excess calories with serious comorbidity and body mass index (BMI) of 33 0 to 33 9 in adult  Encouraged diet and exercise to help for a healthier BMI  Well adult exam  ·         Continue healthy diet   ·         Encourage exercise 4 times a week or more for minimum 30 minutes  ·         Continue to see dentist, wear seatbelt  ·         Health maintenance reviewed - refer for colonoscopy   Reviewed age appropriate health maintenance screenings and immunizations that are due, risks and benefits of these     Health Maintenance   Topic Date Due    Colorectal Cancer Screening  Never done    COVID-19 Vaccine (3 - Booster for Alfred Peter series) 09/23/2021    Influenza Vaccine (Season Ended) 09/01/2022    Breast Cancer Screening: Mammogram  03/24/2023    Depression Screening  06/13/2023    BMI: Adult  06/13/2023    Annual Physical  06/13/2023    BMI: Followup Plan  06/19/2023    Cervical Cancer Screening  04/22/2026    DTaP,Tdap,and Td Vaccines (2 - Td or Tdap) 09/12/2029    HIV Screening  Completed    Hepatitis C Screening  Completed    Pneumococcal Vaccine: Pediatrics (0 to 5 Years) and At-Risk Patients (6 to 59 Years)  Aged Out    HIB Vaccine  Aged Out    Hepatitis B Vaccine  Aged Out    IPV Vaccine  Aged Out    Hepatitis A Vaccine  Aged Out    Meningococcal ACWY Vaccine  Aged Out    HPV Vaccine  Aged Out No follow-ups on file  Subjective:    HPI     Lilia Washington is a 50 y o  female who presents today for a physical      Chief Complaint   Patient presents with    Physical Exam     Mom recently dx with liver cancer     Labs Only     6/6/22    Medication Refill     pend    HM     CRC- ok  Covid booster- not yet     PHQ-2/9 Depression Screening    Little interest or pleasure in doing things: 0 - not at all  Feeling down, depressed, or hopeless: 0 - not at all  PHQ-2 Score: 0  PHQ-2 Interpretation: Negative depression screen        ---Above per clinical staff & reviewed  ---  Patient here today for a physical:    Diet: healthy for few weeks - food journal   Exercise:  Not yet   Dental visits:  Yes   Seatbelt: yes     Concerns today: Mom was dx with liver cancer   Considering chemo   Mom 67 and living on her own   She did well with the surgery   Pt feels she is ready to take care of herself - has been taking care of everyone else   Traveling a lot         The following portions of the patient's history were reviewed and updated as appropriate: allergies, current medications, past family history, past medical history, past social history, past surgical history and problem list      Current Medications:  Current Outpatient Medications   Medication Sig Dispense Refill    norgestimate-ethinyl estradiol (Whitney) 0 25-35 MG-MCG per tablet Take 1 tablet by mouth daily 84 tablet 0    valACYclovir (VALTREX) 1,000 mg tablet TAKE TWO TABS BY MOUTH AT ONSET, THEN TWO TABS 12 HRS  LATER 12 tablet 0    vitamin B-12 (CYANOCOBALAMIN) 500 MCG TABS Take 1 tablet (500 mcg total) by mouth daily      cyclobenzaprine (FLEXERIL) 10 mg tablet Take 1 tablet (10 mg total) by mouth 3 (three) times a day as needed for muscle spasms 30 tablet 0     No current facility-administered medications for this visit          Objective:      /70   Pulse 86   Temp (!) 97 3 °F (36 3 °C)   Resp 16   Ht 5' 8" (1 727 m)   Wt 98 9 kg (218 lb) SpO2 99%   BMI 33 15 kg/m²   BP Readings from Last 3 Encounters:   06/17/22 132/79   06/13/22 116/70   04/22/21 112/76     Wt Readings from Last 3 Encounters:   06/13/22 98 9 kg (218 lb)   03/24/22 97 5 kg (215 lb)   04/22/21 93 kg (205 lb)       Review of Systems  ROS:  all others negative - no chest pain, SOB, normal urine and bowels  no GERD  sleeping well  mood good  Physical Exam   Constitutional: she appears well-developed and well-nourished  HENT: Head: Normocephalic  Right Ear: External ear normal  Tympanic membrane normal    Left Ear: External ear normal  Tympanic membrane normal    Nose: Nose normal  No mucosal edema, No rhinorrhea  Right sinus exhibits no maxillary sinus tenderness  Left sinus exhibits no maxillary sinus tenderness  Mouth/Throat: Oropharynx is clear and moist    Eyes: Normal conjunctiva  No erythema  No discharge  Neck: No pain on exam  Neck supple  Cardiovascular: Normal rate, regular rhythm and normal heart sounds  Pulmonary/Chest: Effort normal and breath sounds normal  No wheezes  No rales  No rhonchi  Abdominal: Soft  Bowel sounds are normal  There is no tenderness  Musculoskeletal: she exhibits no edema  Lymphadenopathy: she has no cervical adenopathy  Neurological: she  is alert and oriented to person, place, and time  Skin: Skin is warm and dry  No rashes  Psychiatric: she  has a normal mood and affect  her behavior is normal  Thought content normal    Vitals reviewed  BMI Counseling: Body mass index is 33 15 kg/m²  The BMI is above normal  Nutrition recommendations include decreasing portion sizes  Exercise recommendations include exercising 3-5 times per week  Rationale for BMI follow-up plan is due to patient being overweight or obese  Depression Screening and Follow-up Plan: Patient was screened for depression during today's encounter  They screened negative with a PHQ-2 score of 0

## 2022-06-17 ENCOUNTER — OFFICE VISIT (OUTPATIENT)
Dept: URGENT CARE | Age: 47
End: 2022-06-17
Payer: COMMERCIAL

## 2022-06-17 VITALS
DIASTOLIC BLOOD PRESSURE: 79 MMHG | RESPIRATION RATE: 16 BRPM | TEMPERATURE: 97.5 F | SYSTOLIC BLOOD PRESSURE: 132 MMHG | OXYGEN SATURATION: 99 % | HEART RATE: 74 BPM

## 2022-06-17 DIAGNOSIS — M62.838 NECK MUSCLE SPASM: Primary | ICD-10-CM

## 2022-06-17 PROCEDURE — 99213 OFFICE O/P EST LOW 20 MIN: CPT | Performed by: STUDENT IN AN ORGANIZED HEALTH CARE EDUCATION/TRAINING PROGRAM

## 2022-06-17 RX ORDER — CYCLOBENZAPRINE HCL 10 MG
10 TABLET ORAL 3 TIMES DAILY PRN
Qty: 30 TABLET | Refills: 0 | Status: SHIPPED | OUTPATIENT
Start: 2022-06-17

## 2022-06-17 NOTE — PROGRESS NOTES
[facility]        NAME: Michael Gibbons is a 50 y o  female  : 1975    MRN: [id number]  DATE: 2022  TIME: 1:04 PM    Assessment and Plan   Neck muscle spasm [M62 838]  1  Neck muscle spasm  cyclobenzaprine (FLEXERIL) 10 mg tablet         Patient Instructions       Follow up with PCP in 3-5 days  Proceed to  ER if symptoms worsen  Chief Complaint     Chief Complaint   Patient presents with    Shoulder Pain     Left shoulder pain, starts in left side of neck and radiates in to left fingers  Has pins/needles in fingers and pain/presure in neck and shoulder  Woke up from hotel bed, since last Friday  History of Present Illness       HPI patient presents complaining of increased left shoulder pain and base of the neck pain in her arm and neck radiating down her arm  Patient states that she saw her chiropractor initially patient the pain has improved, and then she slept in a hotel with the bed bed and pain recurred  Patient denies any weakness numbness    Review of Systems   Review of Systems  Per hpi     Current Medications       Current Outpatient Medications:     cyclobenzaprine (FLEXERIL) 10 mg tablet, Take 1 tablet (10 mg total) by mouth 3 (three) times a day as needed for muscle spasms, Disp: 30 tablet, Rfl: 0    norgestimate-ethinyl estradiol (Whitney) 0 25-35 MG-MCG per tablet, Take 1 tablet by mouth daily, Disp: 84 tablet, Rfl: 0    valACYclovir (VALTREX) 1,000 mg tablet, TAKE TWO TABS BY MOUTH AT ONSET, THEN TWO TABS 12 HRS   LATER, Disp: 12 tablet, Rfl: 0    vitamin B-12 (CYANOCOBALAMIN) 500 MCG TABS, Take 1 tablet (500 mcg total) by mouth daily, Disp: , Rfl:     Current Allergies     Allergies as of 2022    (No Known Allergies)            The following portions of the patient's history were reviewed and updated as appropriate: allergies, current medications, past family history, past medical history, past social history, past surgical history and problem list  Past Medical History:   Diagnosis Date    Anemia     Dry eye syndrome of both eyes     Fibroids     2 cm calcified fibroid    HELLP syndrome     she does not have HTN     History of infertility     IVF/ICSI    Irregular menses        Past Surgical History:   Procedure Laterality Date     SECTION      COLONOSCOPY      in [place] - normal      ENDOMETRIAL BIOPSY  2014    proliferative endometrium    KNEE ARTHROSCOPY Right        Family History   Problem Relation Age of Onset    Diabetes Mother     Hyperlipidemia Mother     Osteopenia Mother     Liver cancer Mother         dx  in fall, was in bile duct (rare)    Atrial fibrillation Mother     Cataracts Mother     Diabetes Father     Hypertension Father     Hyperlipidemia Father     Myasthenia gravis Father     Colon polyps Brother     No Known Problems Daughter     No Known Problems Maternal Grandmother     No Known Problems Maternal Grandfather     Breast cancer Paternal Grandmother 76    No Known Problems Paternal Grandfather     No Known Problems Maternal Aunt     No Known Problems Paternal Aunt     No Known Problems Paternal Aunt     No Known Problems Paternal Aunt          Medications have been verified  Objective   /79   Pulse 74   Temp 97 5 °F (36 4 °C)   Resp 16   SpO2 99%   No LMP recorded  Physical Exam     Physical Exam  Constitutional:       General: She is not in acute distress  Appearance: Normal appearance  HENT:      Head: Normocephalic  Nose: No congestion or rhinorrhea  Mouth/Throat:      Mouth: Mucous membranes are moist       Pharynx: No oropharyngeal exudate or posterior oropharyngeal erythema  Eyes:      General:         Right eye: No discharge  Left eye: No discharge  Conjunctiva/sclera: Conjunctivae normal    Cardiovascular:      Rate and Rhythm: Normal rate and regular rhythm  Pulses: Normal pulses     Pulmonary:      Effort: Pulmonary effort is normal  No respiratory distress  Abdominal:      General: Abdomen is flat  There is no distension  Palpations: Abdomen is soft  Tenderness: There is no abdominal tenderness  Musculoskeletal:      Cervical back: Neck supple  Comments: Tight musculature noted at the base of the neck with some tenderness to deep palpation   Skin:     General: Skin is warm  Capillary Refill: Capillary refill takes less than 2 seconds  Neurological:      Mental Status: She is alert and oriented to person, place, and time

## 2022-06-21 ENCOUNTER — TELEPHONE (OUTPATIENT)
Dept: FAMILY MEDICINE CLINIC | Facility: CLINIC | Age: 47
End: 2022-06-21

## 2022-06-21 NOTE — TELEPHONE ENCOUNTER
----- Message from Nely Castellon sent at 6/20/2022 10:03 PM EDT -----  Regarding: Follow up  Im experiencing significant pain in my neck and shoulder  It has been getting worse over the past week  I was seen at the [facility] facility on Friday  The dr prescribed a muscle relaxer and frequent use of a heating pad which has had no impact  Im concerned its something more serious than a pinched nerve  Dr Sai Matamoros calendar looks totally booked this week  Is there someone else in the practice who can see me?

## 2022-06-21 NOTE — TELEPHONE ENCOUNTER
Phone call placed to patient, LM to call the office back, patient needs an appt in the office for neck/shoulder pain

## 2022-06-21 NOTE — PROGRESS NOTES
Assessment/Plan:  Problem List Items Addressed This Visit        Other    Class 1 obesity due to excess calories with serious comorbidity and body mass index (BMI) of 32 0 to 32 9 in adult      Other Visit Diagnoses     Cervical radiculopathy    -  Primary    Relevant Medications    predniSONE 20 mg tablet    Other Relevant Orders    Ambulatory Referral to Physical Therapy    Suspect cervical disc herniation vs  Nerve impingement  Start Prednisone 20mg 3 pills daily x 5 days - side effects discussed  Continue Flexeril PRN  Advise Tylenol, may use Advil once Prednisone course completed  Continue warm compresses  Home Exercise Program given  Neck pain        Relevant Medications    predniSONE 20 mg tablet    Other Relevant Orders    Ambulatory Referral to Physical Therapy    See above  Acute pain of left shoulder        Relevant Medications    predniSONE 20 mg tablet    Other Relevant Orders    Ambulatory Referral to Physical Therapy    See above  Paresthesia of left arm        Relevant Medications    predniSONE 20 mg tablet    Other Relevant Orders    Ambulatory Referral to Physical Therapy    See above  Return if symptoms worsen or fail to improve  Future Appointments   Date Time Provider Bob Erwin   7/25/2022  3:30 PM DO PAKO Mcconnell  [facility]   3/27/2023  8:00 AM [facility]        Subjective:     Corrie Powell is a 50 y o  female who presents today for a follow-up on her acute medical conditions  HPI:  Chief Complaint   Patient presents with    Pain     Starts from neck to the shoulder and down to the arm  It has been two weeks now  [facility]  Was given a muscle relaxer, has not worked just made her tired  She cannot take them during the day only at night before bed because it makes her tired  Has taken Aleve during the day, has not subsided any issues       Medication Refill     None     [facility] Maintenance     Has not scheduled yet, no booster  -- Above per clinical staff and reviewed  --    HPI      Today:      Neck and Left Shoulder Pain - Symptoms x 2 weeks, shorter duration of symptoms as she feels well upon awakening, but symptoms worsen throughout the day  She awoe from sleep in a hotel in the middle of the night with symptoms  Has left neck / trapezius pain that radiates into her left shoulder, down her arm and into her left 4th and 5th fingers  Burning pain in shoulder, numbness in arm and fingers  Currently 6-7/10, Max 8-9/10  Worse c wearing a backpack, lifting, laying supine or left lateral recumbent, prolonged sitting  Using heating pad and massage c temporary benefit  Denies trauma  Using Aleve 2 pills QD c unknown benefit  Seen at [email or web address] 6/17/22  Dx Neck Muscle Spasm  Rx Flexeril 10mg 1 pill TID PRN - has been using BID as it allows her to sleep, but she cannot take during the work day  The following portions of the patient's history were reviewed and updated as appropriate: allergies, current medications, past family history, past medical history, past social history, past surgical history and problem list       Review of Systems   Constitutional: Positive for fatigue  Negative for appetite change, chills, diaphoresis and fever  Respiratory: Negative for chest tightness and shortness of breath  Cardiovascular: Negative for chest pain  Gastrointestinal: Negative for abdominal pain, blood in stool, diarrhea, nausea and vomiting  Genitourinary: Negative for dysuria  Musculoskeletal: Positive for arthralgias and neck pain  Neurological: Positive for numbness          Current Outpatient Medications   Medication Sig Dispense Refill    cyclobenzaprine (FLEXERIL) 10 mg tablet Take 1 tablet (10 mg total) by mouth 3 (three) times a day as needed for muscle spasms 30 tablet 0    norgestimate-ethinyl estradiol (Whitney) 0 25-35 MG-MCG per tablet Take 1 tablet by mouth daily 84 tablet 0    predniSONE 20 mg tablet Take 3 tablets (60 mg total) by mouth daily for 5 days 15 tablet 0    valACYclovir (VALTREX) 1,000 mg tablet TAKE TWO TABS BY MOUTH AT ONSET, THEN TWO TABS 12 HRS  LATER 12 tablet 0    vitamin B-12 (CYANOCOBALAMIN) 500 MCG TABS Take 1 tablet (500 mcg total) by mouth daily (Patient not taking: Reported on 6/22/2022)       No current facility-administered medications for this visit  Objective:  /78   Pulse 82   Temp (!) 97 4 °F (36 3 °C)   Resp 18   Ht 5' 8" (1 727 m)   Wt 98 1 kg (216 lb 3 2 oz)   LMP 06/08/2022 (Within Weeks)   SpO2 99%   Breastfeeding No   BMI 32 87 kg/m²    Wt Readings from Last 3 Encounters:   06/22/22 98 1 kg (216 lb 3 2 oz)   06/13/22 98 9 kg (218 lb)   03/24/22 97 5 kg (215 lb)      BP Readings from Last 3 Encounters:   06/22/22 112/78   06/17/22 132/79   06/13/22 116/70          Physical Exam  Vitals and nursing note reviewed  Constitutional:       Appearance: Normal appearance  She is well-developed  She is obese  HENT:      Head: Normocephalic and atraumatic  Eyes:      Conjunctiva/sclera: Conjunctivae normal    Neck:      Thyroid: No thyromegaly  Cardiovascular:      Rate and Rhythm: Normal rate and regular rhythm  Pulses: Normal pulses  Heart sounds: Normal heart sounds  Pulmonary:      Effort: Pulmonary effort is normal       Breath sounds: Normal breath sounds  Musculoskeletal:         General: Tenderness present  No swelling  Right shoulder: Normal       Left shoulder: Normal       Right upper arm: Normal       Left upper arm: Normal       Cervical back: Neck supple  Muscular tenderness (Left Trapezius and Subscapularis) present  No spinous process tenderness  Decreased range of motion (Extension - causes LUE paresthesias)  Right lower leg: No edema  Left lower leg: No edema  Comments: B/L Clavicle stable  B/L Shoulders c full AROM  5/5 MS       Normal sensation to touch B/L hands  2/5 B/L radial pulses  Neurological:      General: No focal deficit present  Mental Status: She is alert and oriented to person, place, and time  Cranial Nerves: No cranial nerve deficit  Sensory: No sensory deficit  Lab Results:      Lab Results   Component Value Date    WBC 5 33 06/06/2022    HGB 11 8 06/06/2022    HCT 36 9 06/06/2022     06/06/2022    CHOL 199 11/27/2013    TRIG 164 (H) 06/06/2022    HDL 66 06/06/2022    ALT 23 06/06/2022    AST 16 06/06/2022    K 4 7 06/06/2022     06/06/2022    CREATININE 0 96 06/06/2022    BUN 14 06/06/2022    CO2 28 06/06/2022    GLUF 89 06/06/2022    HGBA1C 5 3 11/27/2013     No results found for: URICACID  Invalid input(s): BASENAME Vitamin D    No results found       POCT Labs

## 2022-06-22 ENCOUNTER — OFFICE VISIT (OUTPATIENT)
Dept: FAMILY MEDICINE CLINIC | Facility: CLINIC | Age: 47
End: 2022-06-22
Payer: COMMERCIAL

## 2022-06-22 VITALS
WEIGHT: 216.2 LBS | RESPIRATION RATE: 18 BRPM | SYSTOLIC BLOOD PRESSURE: 112 MMHG | HEART RATE: 82 BPM | TEMPERATURE: 97.4 F | OXYGEN SATURATION: 99 % | BODY MASS INDEX: 32.77 KG/M2 | DIASTOLIC BLOOD PRESSURE: 78 MMHG | HEIGHT: 68 IN

## 2022-06-22 DIAGNOSIS — R20.2 PARESTHESIA OF LEFT ARM: ICD-10-CM

## 2022-06-22 DIAGNOSIS — M54.2 NECK PAIN: ICD-10-CM

## 2022-06-22 DIAGNOSIS — E66.09 CLASS 1 OBESITY DUE TO EXCESS CALORIES WITH SERIOUS COMORBIDITY AND BODY MASS INDEX (BMI) OF 32.0 TO 32.9 IN ADULT: ICD-10-CM

## 2022-06-22 DIAGNOSIS — M25.512 ACUTE PAIN OF LEFT SHOULDER: ICD-10-CM

## 2022-06-22 DIAGNOSIS — M54.12 CERVICAL RADICULOPATHY: Primary | ICD-10-CM

## 2022-06-22 PROCEDURE — 99214 OFFICE O/P EST MOD 30 MIN: CPT | Performed by: FAMILY MEDICINE

## 2022-06-22 PROCEDURE — 3008F BODY MASS INDEX DOCD: CPT | Performed by: FAMILY MEDICINE

## 2022-06-22 PROCEDURE — 1036F TOBACCO NON-USER: CPT | Performed by: FAMILY MEDICINE

## 2022-06-22 RX ORDER — PREDNISONE 20 MG/1
60 TABLET ORAL DAILY
Qty: 15 TABLET | Refills: 0 | Status: SHIPPED | OUTPATIENT
Start: 2022-06-22 | End: 2022-06-27

## 2022-06-22 NOTE — PATIENT INSTRUCTIONS
You may use Tylenol (Acetaminophen) up to 3,000mg daily (in 24 hours) as needed for pain or fever  Use Aleve up to 2 pills twice daily as needed for pain after Prednisone is completed

## 2022-07-11 ENCOUNTER — EVALUATION (OUTPATIENT)
Dept: PHYSICAL THERAPY | Facility: CLINIC | Age: 47
End: 2022-07-11
Payer: COMMERCIAL

## 2022-07-11 DIAGNOSIS — M25.512 ACUTE PAIN OF LEFT SHOULDER: ICD-10-CM

## 2022-07-11 DIAGNOSIS — M54.2 NECK PAIN: ICD-10-CM

## 2022-07-11 DIAGNOSIS — R20.2 PARESTHESIA OF LEFT ARM: ICD-10-CM

## 2022-07-11 DIAGNOSIS — M54.12 CERVICAL RADICULOPATHY: ICD-10-CM

## 2022-07-11 PROCEDURE — 97161 PT EVAL LOW COMPLEX 20 MIN: CPT

## 2022-07-11 NOTE — PROGRESS NOTES
PT Evaluation     Today's date: 2022  Patient name: Mary Pradhan  : 1975  MRN: [id number]  Referring provider: Kelsie De La Cruz DO  Dx:   Encounter Diagnosis     ICD-10-CM    1  Neck pain  M54 2 Ambulatory Referral to Physical Therapy   2  Acute pain of left shoulder  M25 512 Ambulatory Referral to Physical Therapy   3  Cervical radiculopathy  M54 12 Ambulatory Referral to Physical Therapy   4  Paresthesia of left arm  R20 2 Ambulatory Referral to Physical Therapy                  Assessment  Assessment details: Mary Pradhan is a 50 y o  female who presents with residual effects of an acute episode of cervical radiculopathy  Patient presents with upper cervical mobility deficits, decreased periscap strength, and poor work/sleep positioning that is impacting her cervical positioning  Due to these impairments, the patient has difficulty performing activities such as sleeping, working, or sitting unsupported in pain  Patient would benefit from skilled physical therapy to address the impairments, improve their level of function, and to improve their overall quality of life  Impairments: abnormal or restricted ROM, abnormal movement, activity intolerance, lacks appropriate home exercise program, pain with function and poor posture   Prognosis details: Positive prognostic indicators include: absence of observed red flags, positive attitude towards recovery, good understanding of diagnosis and treatment plan   Negative prognostic indicators include: fear       Goals  Short Term Goals: to be achieved by 4 weeks  1) Patient to be independent with basic HEP  2) Decrease pain to 2/10 at its worst   3) Increase cervical spine ROM by 5-10 degrees in all deficient planes (rotation)  4) Improve joint mobility in cervical spine to normal     Long Term Goals: to be achieved by discharge  1) FOTO equal to or greater than expected  2) Patient to be independent with comprehensive HEP    3) Cervical spine ROM WNL all planes to improve a/iadls  4) Increase UE strength to 1 MMT grade in all planes to improve a/iadls  5) Lifting is improved to maximal level of function       Plan  Patient would benefit from: PT eval and skilled physical therapy  Planned modality interventions: low level laser therapy  Planned therapy interventions: manual therapy, neuromuscular re-education, patient education, self care, therapeutic activities, therapeutic exercise, activity modification and home exercise program  Frequency: 1x/week for 4 weeks  Treatment plan discussed with: patient        Subjective Evaluation    History of Present Illness  Mechanism of injury: History: Pt reports that in the middle of , she was having neck muscle spasm and went to urgent care  Symptoms began in the middle of the night when sleeping at a hotel  She was having left-sided neck pain that radiated into her fingers  She was having pins/needles  She was given muscle relaxants which didn't help and told to follow-up with PCP  She ended up taking Prednisone for a week which helped  She is feeling better today with no radicular symptoms  her chief complaint is stiffness rather than pain  Sleeping is not an issue but wakes up kind of stiff  She feels like her computer setup is an issue  Driving is no issue  Aggravating: [address] and sleeping   Alleviating: chiro, heating pad, Aleve (2 in the morning and 2 at night)   24 hours: morning stiffness and better as the day goes on   Functional limitations: hesitant to go back to working out  Hobbies/occupation: equal amounts [address] 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)    Imaging: n/a   Red flags: Arty Both denies a new onset of Bladder incontinence, Bowel dysfunction, Nausea and Ataxia      Pain  Current pain ratin  At best pain ratin  At worst pain ratin  Location: L UT and periscap   Quality: dull ache          Objective     Neurological Testing     Sensation   Cervical/Thoracic   Left   Intact: light touch    Right   Intact: light touch    Active Range of Motion   Cervical/Thoracic Spine       Cervical    Flexion: 55 degrees   Extension: 40 degrees      Left lateral flexion: 30 degrees      Right lateral flexion: 30 degrees      Left rotation: 60 degrees  Right rotation: 75 degrees         Left Shoulder   Normal active range of motion    Right Shoulder   Normal active range of motion    Joint Play     Hypomobile: C1 and C2     Strength/Myotome Testing     Left Shoulder     Planes of Motion   Flexion: 5   Abduction: 5   External rotation at 0°: 5     Right Shoulder     Planes of Motion   Flexion: 5   Abduction: 5   External rotation at 0°: 5     Left Elbow   Flexion: 5  Extension: 5    Right Elbow   Flexion: 5  Extension: 5             Precautions: none      Manuals 7/11            STM suboccipital/stretch CARLEE                                                   Neuro Re-Ed             Rows GTB x10 HEP            TB W's GTB x10 HEP            Bent over row              DNF supine             DNF/DNE stacked fists                                        Ther Ex             Scap squeeze x10 HEP            Cervical SNAG Extension, rotation HEP            UBE             Straight arm pull down                                                                  Ther Activity                                       Pt Edu             Sleeping positioning Towel roll in pillow case, pillow under arm or between knees             Computer positioning Separate monitor/keyboard, arms resting on table             Modalities

## 2022-07-25 ENCOUNTER — APPOINTMENT (OUTPATIENT)
Dept: PHYSICAL THERAPY | Facility: CLINIC | Age: 47
End: 2022-07-25
Payer: COMMERCIAL

## 2022-07-27 NOTE — PROGRESS NOTES
Patient reached out to therapy and wishes to be discharged from therapy at this time due to meeting goals and feeling resolution of symptoms

## 2022-07-29 ENCOUNTER — APPOINTMENT (OUTPATIENT)
Dept: PHYSICAL THERAPY | Facility: CLINIC | Age: 47
End: 2022-07-29
Payer: COMMERCIAL

## 2022-08-01 DIAGNOSIS — N92.1 EXCESSIVE AND FREQUENT MENSTRUATION WITH IRREGULAR CYCLE: ICD-10-CM

## 2022-08-01 RX ORDER — NORGESTIMATE AND ETHINYL ESTRADIOL 0.25-0.035
KIT ORAL
Qty: 84 TABLET | Refills: 0 | Status: SHIPPED | OUTPATIENT
Start: 2022-08-01 | End: 2022-10-17 | Stop reason: ALTCHOICE

## 2022-10-17 ENCOUNTER — ANNUAL EXAM (OUTPATIENT)
Dept: OBGYN CLINIC | Facility: CLINIC | Age: 47
End: 2022-10-17
Payer: COMMERCIAL

## 2022-10-17 VITALS
SYSTOLIC BLOOD PRESSURE: 124 MMHG | WEIGHT: 220 LBS | BODY MASS INDEX: 33.34 KG/M2 | HEIGHT: 68 IN | DIASTOLIC BLOOD PRESSURE: 74 MMHG

## 2022-10-17 DIAGNOSIS — N92.0 MENORRHAGIA WITH REGULAR CYCLE: ICD-10-CM

## 2022-10-17 DIAGNOSIS — Z12.31 ENCOUNTER FOR SCREENING MAMMOGRAM FOR MALIGNANT NEOPLASM OF BREAST: ICD-10-CM

## 2022-10-17 DIAGNOSIS — Z01.419 ENCOUNTER FOR ANNUAL ROUTINE GYNECOLOGICAL EXAMINATION: Primary | ICD-10-CM

## 2022-10-17 DIAGNOSIS — Z12.11 COLON CANCER SCREENING: ICD-10-CM

## 2022-10-17 PROCEDURE — S0612 ANNUAL GYNECOLOGICAL EXAMINA: HCPCS | Performed by: OBSTETRICS & GYNECOLOGY

## 2022-10-17 RX ORDER — NORETHINDRONE ACETATE AND ETHINYL ESTRADIOL 1MG-20(21)
1 KIT ORAL DAILY
Qty: 84 TABLET | Refills: 4 | Status: SHIPPED | OUTPATIENT
Start: 2022-10-17

## 2022-10-17 NOTE — PROGRESS NOTES
Assessment/Plan:   Pap smear deferred due to low risk status  Encouraged self-breast examination as well as calcium supplementation  Continue annual mammogram   Discussed changing to a low-dose birth control pill, will switch to Loestrin /20 x1 year  She will keep a menstrual diary and call with update in 3-4 months  Reviewed colon cancer screening, recommend baseline colonoscopy, referral to GI  Return to office in 1 year or p r n  No problem-specific Assessment & Plan notes found for this encounter  Diagnoses and all orders for this visit:    Encounter for annual routine gynecological examination    Encounter for screening mammogram for malignant neoplasm of breast    Menorrhagia with regular cycle  -     norethindrone-ethinyl estradiol (Loestrin Fe ) 1-20 MG-MCG per tablet; Take 1 tablet by mouth daily    Colon cancer screening  -     Ambulatory Referral to Gastroenterology; Future          Subjective:      Patient ID: Devi Montana is a 50 y o  female  HPI     This is a pleasant 43-year-old female  ( x1, age 13) presents for gyn exam   Her menstrual cycles are regular approximately every 4 weeks lasting 4 days with no breakthrough bleeding  She has been on OCPs since her delivery due to heavy menses  There has been no changes in bowel or bladder function  She has been in a monogamous relationship with her  for over 19 years  Pap smears have been normal   Last Pap   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, 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, conceived through IVF  She does follow up with her family physician for hyperlipidemia  In the course of the year her mother was diagnosed with adenocarcinoma of liver, status post surgery, currently undergoing chemotherapy  During workup also incidental 2 cm intracervical lesion, was evaluated by gyn Oncology with recommendations to remain conservative until chemo completed, uncertain if cervical cancer      She does complain of increased fatigue, job requirements to travel every other week, taking her mother to multiple doctors appointments  The following portions of the patient's history were reviewed and updated as appropriate: allergies, current medications, past family history, past medical history, past social history, past surgical history and problem list     Review of Systems   Constitutional: Negative for fatigue, fever and unexpected weight change  Respiratory: Negative for cough, chest tightness, shortness of breath and wheezing  Cardiovascular: Negative  Negative for chest pain and palpitations  Gastrointestinal: Negative  Negative for abdominal distention, abdominal pain, blood in stool, constipation, diarrhea, nausea and vomiting  Genitourinary: Negative  Negative for difficulty urinating, dyspareunia, dysuria, flank pain, frequency, genital sores, hematuria, pelvic pain, urgency, vaginal bleeding, vaginal discharge and vaginal pain  Skin: Negative for rash  Objective:      /74   Ht 5' 8" (1 727 m)   Wt 99 8 kg (220 lb)   LMP 09/28/2022   BMI 33 45 kg/m²          Physical Exam  Constitutional:       Appearance: Normal appearance  She is well-developed  Cardiovascular:      Rate and Rhythm: Normal rate and regular rhythm  Pulmonary:      Effort: Pulmonary effort is normal       Breath sounds: Normal breath sounds  Chest:   Breasts:      Right: No inverted nipple, mass, nipple discharge, skin change or tenderness  Left: No inverted nipple, mass, nipple discharge, skin change or tenderness  Abdominal:      General: Bowel sounds are normal  There is no distension  Palpations: Abdomen is soft  Tenderness: There is no abdominal tenderness  There is no guarding or rebound  Genitourinary:     Labia:         Right: No rash, tenderness or lesion  Left: No rash, tenderness or lesion  Vagina: Normal  No signs of injury  No vaginal discharge or tenderness        Cervix: No cervical motion tenderness, discharge, friability, lesion, erythema or cervical bleeding  Uterus: Not enlarged and not tender  Adnexa:         Right: No mass, tenderness or fullness  Left: No mass, tenderness or fullness  Neurological:      Mental Status: She is alert and oriented to person, place, and time     Psychiatric:         Behavior: Behavior normal

## 2023-01-06 ENCOUNTER — PREP FOR PROCEDURE (OUTPATIENT)
Dept: GASTROENTEROLOGY | Facility: CLINIC | Age: 48
End: 2023-01-06

## 2023-01-06 ENCOUNTER — TELEPHONE (OUTPATIENT)
Dept: GASTROENTEROLOGY | Facility: CLINIC | Age: 48
End: 2023-01-06

## 2023-01-06 DIAGNOSIS — Z12.11 SCREENING FOR COLON CANCER: Primary | ICD-10-CM

## 2023-01-06 NOTE — TELEPHONE ENCOUNTER
Scheduled date of colonoscopy (as of today): 3/20/23    Physician performing colonoscopy: Dr Kiki Gamboa    [facility] of colonoscopy:  [facility]

## 2023-01-09 ENCOUNTER — TELEPHONE (OUTPATIENT)
Dept: OBGYN CLINIC | Facility: CLINIC | Age: 48
End: 2023-01-09

## 2023-01-09 NOTE — TELEPHONE ENCOUNTER
----- Message from Gerre Denver, DO sent at 1/8/2023  7:26 PM EST -----  Regarding: FW: New pill  Contact: [phone]  Please call pt   Instruct cont current OCP  Please make sure has enough refills to next annual visit  Thx      ----- Message -----  From: Antwan Jaquez RN  Sent: 1/6/2023   1:06 PM EST  To: Gerre Denver, DO  Subject: FW: New pill                                       ----- Message -----  From: Mis Mccollum  Sent: 1/6/2023  12:44 PM EST  To: Yulissa Cheema [facility]  Subject: New pill                                         Margaretlo Dr Ángel Rubio had asked me to message you in January regarding the change to my pill prescription  I'm now on my 4th pack of the lower dose, and everything is fine  I typically spot one day mid-cycle, but it's manageable  And, my actual number of bleeding days is less, so that's a win! See you later in the year!     Kelsie Bah

## 2023-02-03 ENCOUNTER — PATIENT MESSAGE (OUTPATIENT)
Dept: FAMILY MEDICINE CLINIC | Facility: CLINIC | Age: 48
End: 2023-02-03

## 2023-02-07 ENCOUNTER — APPOINTMENT (OUTPATIENT)
Dept: LAB | Age: 48
End: 2023-02-07

## 2023-02-07 DIAGNOSIS — E53.8 B12 DEFICIENCY: ICD-10-CM

## 2023-02-07 LAB — VIT B12 SERPL-MCNC: 805 PG/ML (ref 100–900)

## 2023-03-06 ENCOUNTER — ANESTHESIA (OUTPATIENT)
Dept: ANESTHESIOLOGY | Facility: HOSPITAL | Age: 48
End: 2023-03-06

## 2023-03-06 ENCOUNTER — ANESTHESIA EVENT (OUTPATIENT)
Dept: ANESTHESIOLOGY | Facility: HOSPITAL | Age: 48
End: 2023-03-06

## 2023-03-20 ENCOUNTER — HOSPITAL ENCOUNTER (OUTPATIENT)
Dept: GASTROENTEROLOGY | Facility: AMBULARY SURGERY CENTER | Age: 48
Setting detail: OUTPATIENT SURGERY
Discharge: HOME | End: 2023-03-20
Attending: INTERNAL MEDICINE

## 2023-03-20 ENCOUNTER — ANESTHESIA (OUTPATIENT)
Dept: GASTROENTEROLOGY | Facility: AMBULARY SURGERY CENTER | Age: 48
End: 2023-03-20

## 2023-03-20 ENCOUNTER — ANESTHESIA EVENT (OUTPATIENT)
Dept: GASTROENTEROLOGY | Facility: AMBULARY SURGERY CENTER | Age: 48
End: 2023-03-20

## 2023-03-20 VITALS
HEIGHT: 68 IN | OXYGEN SATURATION: 99 % | DIASTOLIC BLOOD PRESSURE: 80 MMHG | WEIGHT: 206 LBS | RESPIRATION RATE: 18 BRPM | TEMPERATURE: 98.1 F | SYSTOLIC BLOOD PRESSURE: 112 MMHG | BODY MASS INDEX: 31.22 KG/M2 | HEART RATE: 74 BPM

## 2023-03-20 DIAGNOSIS — Z12.11 SCREENING FOR COLON CANCER: ICD-10-CM

## 2023-03-20 PROBLEM — K57.90 DIVERTICULOSIS: Status: ACTIVE | Noted: 2023-03-20

## 2023-03-20 RX ORDER — SODIUM CHLORIDE, SODIUM LACTATE, POTASSIUM CHLORIDE, CALCIUM CHLORIDE 600; 310; 30; 20 MG/100ML; MG/100ML; MG/100ML; MG/100ML
INJECTION, SOLUTION INTRAVENOUS CONTINUOUS PRN
Status: DISCONTINUED | OUTPATIENT
Start: 2023-03-20 | End: 2023-03-20

## 2023-03-20 RX ORDER — PROPOFOL 10 MG/ML
INJECTION, EMULSION INTRAVENOUS AS NEEDED
Status: DISCONTINUED | OUTPATIENT
Start: 2023-03-20 | End: 2023-03-20

## 2023-03-20 RX ORDER — SODIUM CHLORIDE, SODIUM LACTATE, POTASSIUM CHLORIDE, CALCIUM CHLORIDE 600; 310; 30; 20 MG/100ML; MG/100ML; MG/100ML; MG/100ML
125 INJECTION, SOLUTION INTRAVENOUS CONTINUOUS
Status: CANCELLED | OUTPATIENT
Start: 2023-03-20

## 2023-03-20 RX ADMIN — SODIUM CHLORIDE, SODIUM LACTATE, POTASSIUM CHLORIDE, AND CALCIUM CHLORIDE: .6; .31; .03; .02 INJECTION, SOLUTION INTRAVENOUS at 12:54

## 2023-03-20 RX ADMIN — PROPOFOL 100 MG: 10 INJECTION, EMULSION INTRAVENOUS at 13:14

## 2023-03-20 RX ADMIN — PROPOFOL 100 MG: 10 INJECTION, EMULSION INTRAVENOUS at 13:00

## 2023-03-20 RX ADMIN — SODIUM CHLORIDE, SODIUM LACTATE, POTASSIUM CHLORIDE, AND CALCIUM CHLORIDE: .6; .31; .03; .02 INJECTION, SOLUTION INTRAVENOUS at 13:17

## 2023-03-20 RX ADMIN — PROPOFOL 100 MG: 10 INJECTION, EMULSION INTRAVENOUS at 13:04

## 2023-03-20 NOTE — H&P
History and Physical -  [facility]  Jossy Khan 46 y o  female MRN: [id number]    HPI: Jossy Khan is a 46y o  year old female who presents for colon cancer screening        Review of Systems    Historical Information   Past Medical History:   Diagnosis Date   • Anemia    • Dry eye syndrome of both eyes    • Fibroids     2 cm calcified fibroid   • HELLP syndrome     she does not have HTN    • History of hemolysis, elevated liver enzymes, and low platelet (HELLP) syndrome 2009   • History of infertility     IVF/ICSI   • Irregular menses      Past Surgical History:   Procedure Laterality Date   •  SECTION     • COLONOSCOPY      in [place] - normal     • ENDOMETRIAL BIOPSY  2014    proliferative endometrium   • KNEE ARTHROSCOPY Right      Social History   Social History     Substance and Sexual Activity   Alcohol Use Yes    Comment: 1 glass few times a week entertaining      Social History     Substance and Sexual Activity   Drug Use No     Social History     Tobacco Use   Smoking Status Never   Smokeless Tobacco Never     Family History   Problem Relation Age of Onset   • Diabetes Mother    • Hyperlipidemia Mother    • Osteopenia Mother    • Liver cancer Mother         dx  in , was in bile duct (rare)   • Atrial fibrillation Mother    • Cataracts Mother    • Liver disease Mother    • Diabetes Father    • Hypertension Father    • Hyperlipidemia Father    • Myasthenia gravis Father    • Colon polyps Brother    • No Known Problems Daughter    • No Known Problems Maternal Grandmother    • No Known Problems Maternal Grandfather    • Breast cancer Paternal Grandmother 76   • No Known Problems Paternal Grandfather    • No Known Problems Maternal Aunt    • No Known Problems Paternal Aunt    • No Known Problems Paternal Aunt    • No Known Problems Paternal Aunt        Meds/Allergies     (Not in a hospital admission)      No Known Allergies    Objective     There were no vitals taken for this visit  PHYSICAL EXAM    Gen: NAD  CV: RRR  CHEST: Clear  ABD: soft, NT/ND  EXT: no edema  Neuro: AAO      ASSESSMENT/PLAN:  This is a 46y o  year old female here for colon cancer screening  Patient was explained about the risks and benefits of the procedure  Risks including but not limited to bleeding, infection, perforation were explained in detail  PLAN:   Procedure: Colonoscopy

## 2023-03-20 NOTE — ANESTHESIA POSTPROCEDURE EVALUATION
Post-Op Assessment Note    CV Status:  Stable  Pain Score: 0    Pain management: adequate     Mental Status:  Alert and awake   Hydration Status:  Euvolemic   PONV Controlled:  Controlled   Airway Patency:  Patent      Post Op Vitals Reviewed: Yes      Staff: CRNA         No notable events documented      BP   100/67   Temp  98   Pulse 70   Resp   16   SpO2   100

## 2023-03-20 NOTE — ANESTHESIA PREPROCEDURE EVALUATION
Procedure:  COLONOSCOPY    Relevant Problems   CARDIO   (+) Migraine without aura and without status migrainosus, not intractable   (+) Pure hypercholesterolemia      NEURO/PSYCH   (+) Migraine without aura and without status migrainosus, not intractable      Other   (+) Class 1 obesity due to excess calories with serious comorbidity and body mass index (BMI) of 32 0 to 32 9 in adult        Physical Exam    Airway    Mallampati score: II  TM Distance: >3 FB  Neck ROM: full     Dental   Comment: Denies loose teeth,     Cardiovascular  Cardiovascular exam normal    Pulmonary  Pulmonary exam normal     Other Findings  Portions of exam deferred due to low yield and/or unknown COVID status      Anesthesia Plan  ASA Score- 2     Anesthesia Type- IV sedation with anesthesia with ASA Monitors  Additional Monitors:   Airway Plan:           Plan Factors-Exercise tolerance (METS): >4 METS  Chart reviewed  Existing labs reviewed  Patient summary reviewed  Patient is not a current smoker  Induction- intravenous  Postoperative Plan-     Informed Consent- Anesthetic plan and risks discussed with patient  I personally reviewed this patient with the CRNA  Discussed and agreed on the Anesthesia Plan with the CRNA  Kenny Chen

## 2023-04-19 NOTE — ADDENDUM NOTE
"Subjective:     CC:    Chief Complaint   Patient presents with    Establish Care       HISTORY OF THE PRESENT ILLNESS: Michelle is a pleasant 35 y.o. female here today to establish care and discuss obtaining labs.  Patient recently went to urgent care for an upper respiratory infection.  She had her urine checked and there was glucose detected.  She was referred here for additional lab work by the urgent care provider.    Patient is healthy with no known health conditions.  She does not take any medications.  She does have a family history of cholesterol and diabetes.  She denies frequent thirst, urination, hunger.  No vision disturbances.    Patient states when she was younger, she did notice darkness around her neck and read this could be insulin resistance, however, that the problem resolved after she lost weight.    Health Maintenance: Completed    ROS:   All systems negative except as addressed in assessment and plan.     Objective:     Exam: BP (!) 132/90 (BP Location: Left arm, Patient Position: Sitting, BP Cuff Size: Adult)   Pulse 95   Temp 36.5 °C (97.7 °F) (Temporal)   Ht 1.702 m (5' 7\")   Wt 106 kg (233 lb)   SpO2 96%  Body mass index is 36.49 kg/m².    Physical Exam  Vitals reviewed.   Constitutional:       Appearance: Normal appearance.   HENT:      Right Ear: Tympanic membrane normal.      Left Ear: Tympanic membrane normal.   Cardiovascular:      Rate and Rhythm: Normal rate.      Pulses: Normal pulses.      Heart sounds: Normal heart sounds.   Pulmonary:      Effort: Pulmonary effort is normal.      Breath sounds: Normal breath sounds.   Abdominal:      General: Abdomen is flat.      Palpations: Abdomen is soft.   Musculoskeletal:         General: Normal range of motion.   Skin:     General: Skin is warm and dry.   Neurological:      Mental Status: Mental status is at baseline.   Psychiatric:         Mood and Affect: Mood normal.         Behavior: Behavior normal.     Labs: No labs on file to " Addended by: Shonna Santacruz on: 4/22/2021 02:17 PM     Modules accepted: Orders review.    Assessment & Plan:   35 y.o. female with the following -    1. Preventative health care  Health conditions and medications reviewed and updated. All screenings discussed and up-to-date. Health maintenance completed.     - TSH WITH REFLEX TO FT4; Future  - VITAMIN D,25 HYDROXY (DEFICIENCY); Future  - Lipid Profile; Future  - HEMOGLOBIN A1C; Future  - Comp Metabolic Panel; Future  - CBC WITH DIFFERENTIAL; Future    2. Glucose found in urine on examination  3. Family history of diabetes mellitus  - HEMOGLOBIN A1C; Future  - Comp Metabolic Panel; Future    4. Obesity (BMI 35.0-39.9 without comorbidity)  Chronic, stable  Body mass index is 36.49 kg/m².  Counseled on diet and lifestyle.     - TSH WITH REFLEX TO FT4; Future  - Lipid Profile; Future  - HEMOGLOBIN A1C; Future    5. Elevated blood pressure reading  New problem.  132/90 in clinic today  Patient will return for lab review and BP follow-up    6. Encounter for screening for malignant neoplasm of colon    7. Encounter for hepatitis C screening test for low risk patient  - HEP C VIRUS ANTIBODY; Future    Patient was educated in proper administration of medication(s) ordered today including safety, possible SE, risks, benefits, rationale and alternatives to therapy.   Supportive care, differential diagnoses, and indications for immediate follow-up discussed with patient.    Pathogenesis of diagnosis discussed including typical length and natural progression.    Instructed to return to clinic or nearest emergency department for any change in condition, further concerns, or worsening of symptoms.  Patient states understanding of the plan of care and discharge instructions.    Return for Lab Review.    I spent a total of 25 minutes with record review, exam, and communication with the patient, communication with other providers, and documentation of this encounter. This does not include time spent on separately billable procedures/tests.    I have placed the  above orders and discussed them with an approved delegating provider.  The MA is performing the below orders under the direction of Dr. Pantoja.    Please note that this dictation was created using voice recognition software. I have worked with consultants from the vendor as well as technical experts from [facility] to optimize the interface. I have made every reasonable attempt to correct obvious errors, but I expect that there are errors of grammar and possibly content that I did not discover before finalizing the note.

## 2023-05-01 ENCOUNTER — AMB VIDEO VISIT (OUTPATIENT)
Dept: OTHER | Facility: HOSPITAL | Age: 48
End: 2023-05-01

## 2023-05-01 VITALS — RESPIRATION RATE: 16 BRPM

## 2023-05-01 DIAGNOSIS — J01.90 ACUTE SINUSITIS, RECURRENCE NOT SPECIFIED, UNSPECIFIED LOCATION: Primary | ICD-10-CM

## 2023-05-01 RX ORDER — AMOXICILLIN AND CLAVULANATE POTASSIUM 875; 125 MG/1; MG/1
1 TABLET, FILM COATED ORAL EVERY 12 HOURS SCHEDULED
Qty: 14 TABLET | Refills: 0 | Status: SHIPPED | OUTPATIENT
Start: 2023-05-01 | End: 2023-05-08

## 2023-05-01 NOTE — CARE ANYWHERE EVISITS
Visit Summary for [facility] - Gender: Female - Date of Birth: [id number]  Date: [id number] - Duration: 5 minutes  Patient: [facility]  Provider: Kristi SILVEIRA    Patient Contact Information  Address  [address]; [address]  [phone]    Visit Topics  Cold [Added By: Self - 2023-05-01]  Earache [Added By: Self - 2023-05-01]  Headache [Added By: Self - 2023-05-01]    Triage Questions   What is your current physical address in the event of a medical emergency? Answer []  Are you allergic to any medications? Answer []  Are you now or could you be pregnant? Answer []  Do you have any immune system compromise or chronic lung   disease? Answer []  Do you have any vulnerable family members in the home (infant, pregnant, cancer, elderly)? Answer []     Conversation Transcripts  [0A][0A] [Notification] You are connected with Monique Clemente, Urgent Care Specialist [0A][Notification] [facility] is located in [place]  [0A][Notification] [facility] has shared health history  Dick Gurrola  [0A]    Diagnosis  Acute sinusitis, unspecified    Procedures  Value: [id number] Code: CPT-4 UNLISTED E&M SERVICE    Medications Prescribed    No prescriptions ordered    Electronically signed by: Monique Kimbrough(NPI [id number])

## 2023-05-01 NOTE — PROGRESS NOTES
Video Visit - Long Steele 46 y o  female MRN: [id number]    REQUIRED DOCUMENTATION:         1  This service was provided via [facility]  2  Provider located at [address] [place] [id number] [phone]  3  [facility] provider: JORDANA Meade  4  Identify all parties in room with patient during [facility] visit:  Patient   5  After connecting through [employer], patient was identified by name and date of birth  Patient was then informed that this was a Telemedicine visit and that the exam was being conducted confidentially over secure lines  My office door was closed  No one else was in the room  Patient acknowledged consent and understanding of privacy and security of the Telemedicine visit  I informed the patient that I have reviewed their record in Epic and presented the opportunity for them to ask any questions regarding the visit today  The patient agreed to participate  This is a 46year old fem jerry here today for video visit  She states not beenfeeling well for about 9 days  She macie having lots ocongestion  Pressure in head and ears  She is having a hard time sleeping  She feels as though sinus infection  She is having yellow dischare  She is having cough caused by pot nasal drip  She is also have sore throat and ear pressure  Review of Systems   Constitutional: Negative for activity change, diaphoresis and fever  HENT: Positive for congestion, sinus pressure, sinus pain and sore throat  Respiratory: Positive for cough  Neurological: Negative  Psychiatric/Behavioral: Negative  Vitals:    05/01/23 1145   Resp: 16     Physical Exam  Constitutional:       General: She is not in acute distress  Appearance: Normal appearance  She is normal weight  She is not ill-appearing or toxic-appearing  HENT:      Head: Normocephalic and atraumatic  Nose: Congestion and rhinorrhea present        Mouth/Throat:      Pharynx: No posterior oropharyngeal erythema  Eyes:      Conjunctiva/sclera: Conjunctivae normal    Pulmonary:      Effort: Pulmonary effort is normal  No respiratory distress  Comments: No cough or audible wheezing during video visit  Musculoskeletal:      Cervical back: Normal range of motion  Skin:     Comments: No rash on head or neck  Neurological:      Mental Status: She is alert and oriented to person, place, and time  Psychiatric:         Mood and Affect: Mood normal          Behavior: Behavior normal          Thought Content: Thought content normal          Judgment: Judgment normal        Diagnoses and all orders for this visit:    Acute sinusitis, recurrence not specified, unspecified location  -     amoxicillin-clavulanate (AUGMENTIN) 875-125 mg per tablet; Take 1 tablet by mouth every 12 (twelve) hours for 7 days      Patient Instructions   Rest and drink extra fluids  Start antibiotic  Take probiotic  OTC cough and cold as needed  Follow up with PCP if no improvement  GO to ER with any worsening symptoms  Follow up with PCP if not improved, if symptoms are worse, go to the ER

## 2023-05-01 NOTE — PATIENT INSTRUCTIONS
Rest and drink extra fluids  Start antibiotic  Take probiotic  OTC cough and cold as needed  Follow up with PCP if no improvement  GO to ER with any worsening symptoms

## 2023-05-03 ENCOUNTER — HOSPITAL ENCOUNTER (OUTPATIENT)
Dept: NON INVASIVE DIAGNOSTICS | Facility: HOSPITAL | Age: 48
Discharge: HOME | End: 2023-05-03

## 2023-05-09 DIAGNOSIS — R06.09 DOE (DYSPNEA ON EXERTION): ICD-10-CM

## 2023-05-09 RX ORDER — ALBUTEROL SULFATE 90 UG/1
AEROSOL, METERED RESPIRATORY (INHALATION)
Qty: 8.5 G | Refills: 1 | Status: SHIPPED | OUTPATIENT
Start: 2023-05-09

## 2023-05-11 ENCOUNTER — HOSPITAL ENCOUNTER (OUTPATIENT)
Dept: RADIOLOGY | Age: 48
Discharge: HOME | End: 2023-05-11

## 2023-05-11 VITALS — HEIGHT: 68 IN | BODY MASS INDEX: 33.04 KG/M2 | WEIGHT: 218 LBS

## 2023-05-11 DIAGNOSIS — Z12.31 ENCOUNTER FOR SCREENING MAMMOGRAM FOR MALIGNANT NEOPLASM OF BREAST: ICD-10-CM

## 2023-05-31 ENCOUNTER — HOSPITAL ENCOUNTER (OUTPATIENT)
Dept: PULMONOLOGY | Facility: HOSPITAL | Age: 48
Discharge: HOME | End: 2023-05-31
Attending: FAMILY MEDICINE
Payer: COMMERCIAL

## 2023-05-31 DIAGNOSIS — R06.09 DOE (DYSPNEA ON EXERTION): ICD-10-CM

## 2023-05-31 DIAGNOSIS — R07.9 CHEST PAIN, UNSPECIFIED TYPE: ICD-10-CM

## 2023-05-31 DIAGNOSIS — R42 LIGHTHEADEDNESS: ICD-10-CM

## 2023-05-31 PROCEDURE — 94726 PLETHYSMOGRAPHY LUNG VOLUMES: CPT

## 2023-05-31 PROCEDURE — 94060 EVALUATION OF WHEEZING: CPT | Performed by: INTERNAL MEDICINE

## 2023-05-31 PROCEDURE — 94760 N-INVAS EAR/PLS OXIMETRY 1: CPT

## 2023-05-31 PROCEDURE — 94729 DIFFUSING CAPACITY: CPT | Performed by: INTERNAL MEDICINE

## 2023-05-31 PROCEDURE — 94726 PLETHYSMOGRAPHY LUNG VOLUMES: CPT | Performed by: INTERNAL MEDICINE

## 2023-05-31 PROCEDURE — 94729 DIFFUSING CAPACITY: CPT

## 2023-05-31 PROCEDURE — 94060 EVALUATION OF WHEEZING: CPT

## 2023-05-31 RX ORDER — ALBUTEROL SULFATE 2.5 MG/3ML
2.5 SOLUTION RESPIRATORY (INHALATION) ONCE
Status: COMPLETED | OUTPATIENT
Start: 2023-05-31 | End: 2023-05-31

## 2023-05-31 RX ADMIN — ALBUTEROL SULFATE 2.5 MG: 2.5 SOLUTION RESPIRATORY (INHALATION) at 07:29

## 2023-06-05 NOTE — RESULT ENCOUNTER NOTE
Normal spirometry  No signs of asthma or COPD  Continue plan of care - Stress Echo        Message sent to patient via [employer] patient portal

## 2023-06-19 ENCOUNTER — HOSPITAL ENCOUNTER (OUTPATIENT)
Dept: NON INVASIVE DIAGNOSTICS | Facility: HOSPITAL | Age: 48
Discharge: HOME | End: 2023-06-19
Payer: COMMERCIAL

## 2023-06-19 VITALS
BODY MASS INDEX: 33.04 KG/M2 | SYSTOLIC BLOOD PRESSURE: 132 MMHG | HEIGHT: 68 IN | HEART RATE: 75 BPM | WEIGHT: 218 LBS | DIASTOLIC BLOOD PRESSURE: 86 MMHG

## 2023-06-19 VITALS
RESPIRATION RATE: 16 BRPM | HEIGHT: 68 IN | HEART RATE: 74 BPM | DIASTOLIC BLOOD PRESSURE: 72 MMHG | WEIGHT: 218 LBS | BODY MASS INDEX: 33.04 KG/M2 | SYSTOLIC BLOOD PRESSURE: 116 MMHG | OXYGEN SATURATION: 100 %

## 2023-06-19 DIAGNOSIS — R06.09 DOE (DYSPNEA ON EXERTION): ICD-10-CM

## 2023-06-19 DIAGNOSIS — R42 LIGHTHEADEDNESS: ICD-10-CM

## 2023-06-19 DIAGNOSIS — R07.9 CHEST PAIN, UNSPECIFIED TYPE: ICD-10-CM

## 2023-06-19 LAB
AORTIC ROOT: 3 CM
APICAL FOUR CHAMBER EJECTION FRACTION: 56 %
ASCENDING AORTA: 3.1 CM
E WAVE DECELERATION TIME: 210 MS
FRACTIONAL SHORTENING: 43 (ref 28–44)
INTERVENTRICULAR SEPTUM IN DIASTOLE (PARASTERNAL SHORT AXIS VIEW): 0.8 CM
INTERVENTRICULAR SEPTUM: 0.8 CM (ref 0.6–1.1)
LAAS-AP2: 18.8 CM2
LAAS-AP4: 19 CM2
LEFT ATRIUM SIZE: 3.5 CM
LEFT INTERNAL DIMENSION IN SYSTOLE: 2.7 CM (ref 2.1–4)
LEFT VENTRICULAR INTERNAL DIMENSION IN DIASTOLE: 4.7 CM (ref 3.5–6)
LEFT VENTRICULAR POSTERIOR WALL IN END DIASTOLE: 0.9 CM
LEFT VENTRICULAR STROKE VOLUME: 76 ML
LVSV (TEICH): 76 ML
MV E'TISSUE VEL-SEP: 13 CM/S
MV PEAK A VEL: 0.53 M/S
MV PEAK E VEL: 130 CM/S
MV STENOSIS PRESSURE HALF TIME: 61 MS
MV VALVE AREA P 1/2 METHOD: 3.61
RIGHT ATRIUM AREA SYSTOLE A4C: 8.5 CM2
RIGHT VENTRICLE ID DIMENSION: 2.9 CM
SL CV LEFT ATRIUM LENGTH A2C: 5.1 CM
SL CV LV EF: 60
SL CV PED ECHO LEFT VENTRICLE DIASTOLIC VOLUME (MOD BIPLANE) 2D: 105 ML
SL CV PED ECHO LEFT VENTRICLE SYSTOLIC VOLUME (MOD BIPLANE) 2D: 28 ML
TR MAX PG: 21 MMHG
TR PEAK VELOCITY: 2.3 M/S
TRICUSPID ANNULAR PLANE SYSTOLIC EXCURSION: 2 CM
TRICUSPID VALVE PEAK REGURGITATION VELOCITY: 2.28 M/S

## 2023-06-19 PROCEDURE — 93351 STRESS TTE COMPLETE: CPT

## 2023-06-19 PROCEDURE — 93306 TTE W/DOPPLER COMPLETE: CPT | Performed by: INTERNAL MEDICINE

## 2023-06-19 PROCEDURE — 93325 DOPPLER ECHO COLOR FLOW MAPG: CPT

## 2023-06-19 PROCEDURE — 93306 TTE W/DOPPLER COMPLETE: CPT

## 2023-06-19 PROCEDURE — 93320 DOPPLER ECHO COMPLETE: CPT

## 2023-06-19 PROCEDURE — 93350 STRESS TTE ONLY: CPT

## 2023-06-19 NOTE — RESULT ENCOUNTER NOTE
Echocardiogram of the heart shows good squeezing and relaxing function of the heart  There are no significant heart valve abnormalities  There is trace regurgitation or backflow of blood at the level of the tricuspid and pulmonic valves  Continue plan of care      Message sent to patient via [employer] patient portal

## 2023-06-20 LAB
CHEST PAIN STATEMENT: NORMAL
MAX DIASTOLIC BP: 82 MMHG
MAX HEART RATE: 176 BPM
MAX HR PERCENT: 103 %
MAX HR: 176 BPM
MAX PREDICTED HEART RATE: 170 BPM
MAX. SYSTOLIC BP: 144 MMHG
PROTOCOL NAME: NORMAL
RATE PRESSURE PRODUCT: NORMAL
SL CV STRESS RECOVERY BP: NORMAL MMHG
SL CV STRESS RECOVERY HR: 96 BPM
SL CV STRESS RECOVERY O2 SAT: 98 %
SL CV STRESS STAGE REACHED: 4
STRESS ANGINA INDEX: 0
STRESS BASELINE BP: NORMAL MMHG
STRESS BASELINE HR: 74 BPM
STRESS O2 SAT REST: 100 %
STRESS PEAK HR: 166 BPM
STRESS POST ESTIMATED WORKLOAD: 10.1 METS
STRESS POST EXERCISE DUR MIN: 9 MIN
STRESS POST EXERCISE DUR SEC: 1 SEC
STRESS POST O2 SAT PEAK: 98 %
STRESS POST PEAK BP: 162 MMHG
TARGET HR FORMULA: NORMAL
TEST INDICATION: NORMAL
TIME IN EXERCISE PHASE: NORMAL

## 2023-06-20 PROCEDURE — 93350 STRESS TTE ONLY: CPT | Performed by: INTERNAL MEDICINE

## 2023-06-20 NOTE — RESULT ENCOUNTER NOTE
Exercise stress echo is negative for blockage of blood flow to the heart  Continue plan of care      Message sent to patient via [employer] patient portal

## 2023-07-11 ENCOUNTER — OFFICE VISIT (OUTPATIENT)
Dept: FAMILY MEDICINE CLINIC | Facility: CLINIC | Age: 48
End: 2023-07-11
Payer: COMMERCIAL

## 2023-07-11 VITALS
OXYGEN SATURATION: 98 % | BODY MASS INDEX: 33.34 KG/M2 | HEIGHT: 68 IN | RESPIRATION RATE: 16 BRPM | WEIGHT: 220 LBS | HEART RATE: 88 BPM | SYSTOLIC BLOOD PRESSURE: 118 MMHG | DIASTOLIC BLOOD PRESSURE: 64 MMHG | TEMPERATURE: 97.7 F

## 2023-07-11 DIAGNOSIS — Z00.00 WELL ADULT EXAM: Primary | ICD-10-CM

## 2023-07-11 DIAGNOSIS — E78.00 PURE HYPERCHOLESTEROLEMIA: ICD-10-CM

## 2023-07-11 DIAGNOSIS — E55.9 VITAMIN D DEFICIENCY: ICD-10-CM

## 2023-07-11 DIAGNOSIS — E66.09 CLASS 1 OBESITY DUE TO EXCESS CALORIES WITH SERIOUS COMORBIDITY AND BODY MASS INDEX (BMI) OF 33.0 TO 33.9 IN ADULT: ICD-10-CM

## 2023-07-11 DIAGNOSIS — M53.3 SACRO-ILIAC PAIN: ICD-10-CM

## 2023-07-11 DIAGNOSIS — E53.8 LOW SERUM VITAMIN B12: ICD-10-CM

## 2023-07-11 PROCEDURE — 99396 PREV VISIT EST AGE 40-64: CPT | Performed by: FAMILY MEDICINE

## 2023-07-11 PROCEDURE — 99214 OFFICE O/P EST MOD 30 MIN: CPT | Performed by: FAMILY MEDICINE

## 2023-07-11 NOTE — PATIENT INSTRUCTIONS
Acupuncture:    [facility] Justina Varma  [address] [place]   Book online [email or web address]  Justina Varma LAc.   [place], [place]

## 2023-07-11 NOTE — PROGRESS NOTES
Assessment/Plan:     1. Well adult exam  See below     2. Class 1 obesity due to excess calories with serious comorbidity and body mass index (BMI) of 33.0 to 33.9 in adult  Continue working on healthy diet and being active  Can consider weight loss meds to help in future  For now focus on caring for her mother and being kinder to herself   -     Lipid panel; Future    3. Pure hypercholesterolemia  Update labs   -     Comprehensive metabolic panel; Future  -     Lipid panel; Future    4. Vitamin D deficiency  Update labs   -     Vitamin D 25 hydroxy; Future    5. Low serum vitamin B12  Update labs  -     Vitamin B12; Future    6. Sacro-iliac pain  Refer to PT   -     Ambulatory Referral to Physical Therapy; Future     call if she needs help with anxiety related to her mom's illness. Can try ssri. Well adult exam  ·         Continue healthy diet   ·         Encourage exercise 4 times a week or more for minimum 30 minutes. ·         Continue to see dentist, wear seatbelt. ·         Health maintenance reviewed and up-to-date. Reassurance given regarding chest pain/sob. Reviewed age appropriate health maintenance screenings and immunizations that are due, risks and benefits of these.    Health Maintenance   Topic Date Due   • Influenza Vaccine (1) 09/01/2023   • COVID-19 Vaccine (3 - Pfizer series) 07/13/2023 (Originally 6/18/2021)   • BMI: Followup Plan  04/14/2024   • Breast Cancer Screening: Mammogram  05/11/2024   • Depression Screening  07/11/2024   • BMI: Adult  07/11/2024   • Annual Physical  07/11/2024   • Cervical Cancer Screening  04/22/2026   • DTaP,Tdap,and Td Vaccines (2 - Td or Tdap) 09/12/2029   • Colorectal Cancer Screening  03/17/2033   • HIV Screening  Completed   • Hepatitis C Screening  Completed   • Pneumococcal Vaccine: Pediatrics (0 to 5 Years) and At-Risk Patients (6 to 59 Years)  Aged Out   • HIB Vaccine  Aged Out   • IPV Vaccine  Aged Out   • Hepatitis A Vaccine  Aged Out   • Meningococcal ACWY Vaccine  Aged Out   • HPV Vaccine  Aged Out     Return in 1 year (on 7/11/2024) for Annual physical.    Subjective:    RAFY Taveras is a 46 y.o. female who presents today for a physical.     Chief Complaint   Patient presents with   • Follow-up     6 week follow up declined [address] today    • Hip Pain     Left side          Patient Care Team:  Marylou Mcgraw DO as PCP - General (Family Medicine)  DO Danae Cuello CRNP    PHQ-2/9 Depression Screening    Little interest or pleasure in doing things: 0 - not at all  Feeling down, depressed, or hopeless: 0 - not at all  PHQ-2 Score: 0  PHQ-2 Interpretation: Negative depression screen        ?    ---Above per clinical staff & reviewed. ---  Patient here today for a physical:    Diet: grabbing food now, was eating more plant based in past   Exercise: Active walking   Dental visits:  Yes   Seatbelt: yes  Sleeping 7 hours sleep number bed     Concerns today:  Left her job in Nov   Was all in started in Dec with  and diet changes   April was seen for this   Got nervous and stopped everything   Decker overwhelmed when she left   Since then developed pain in her left side back and sometimes to the front - sometimes in the front first   Imani if standing from sitting or certain non supportive chairs   ?bursisitis with menopause   Mom's cancer had mets to her lung  - found out in April   With working out was having pressure on her chest   Rides her bike and not feeling it   Was eating better and not drinking alcohol.    Periods are regular and normal, little lighter           The following portions of the patient's history were reviewed and updated as appropriate: allergies, current medications, past family history, past medical history, past social history, past surgical history and problem list.     Current Medications:  Current Outpatient Medications   Medication Sig Dispense Refill   • albuterol (PROVENTIL HFA,VENTOLIN HFA) 90 mcg/act inhaler INHALE 2 PUFFS EVERY 4 HOURS AS NEEDED FOR WHEEZING OR SHORTNESS OF BREATH FOR UP TO 10 DAYS. 8.5 g 1   • CALCIUM PO Take 1 Dose by mouth daily     • norethindrone-ethinyl estradiol (Loestrin Fe 1/20) 1-20 MG-MCG per tablet Take 1 tablet by mouth daily 84 tablet 4   • valACYclovir (VALTREX) 1,000 mg tablet TAKE TWO TABS BY MOUTH AT ONSET, THEN TWO TABS 12 HRS. LATER 12 tablet 0   • vitamin B-12 (CYANOCOBALAMIN) 500 MCG TABS Take 1 tablet (500 mcg total) by mouth daily     • VITAMIN D PO Take 1 Dose by mouth in the morning       No current facility-administered medications for this visit. Objective:      /64   Pulse 88   Temp 97.7 °F (36.5 °C)   Resp 16   Ht 5' 8" (1.727 m)   Wt 99.8 kg (220 lb)   SpO2 98%   BMI 33.45 kg/m²   BP Readings from Last 3 Encounters:   07/11/23 118/64   06/19/23 116/72   06/19/23 132/86     Wt Readings from Last 3 Encounters:   07/11/23 99.8 kg (220 lb)   06/19/23 98.9 kg (218 lb)   06/19/23 98.9 kg (218 lb)       Review of Systems  ROS:  all others negative - no chest pain, SOB, normal urine and bowels. no GERD. sleeping well. mood with worry. Physical Exam   Constitutional: she appears well-developed and well-nourished. HENT: Head: Normocephalic. Right Ear: External ear normal. Tympanic membrane normal.   Left Ear: External ear normal. Tympanic membrane normal.   Nose: Nose normal. No mucosal edema, No rhinorrhea. Right sinus exhibits no maxillary sinus tenderness. Left sinus exhibits no maxillary sinus tenderness. Mouth/Throat: Oropharynx is clear and moist.   Eyes: Normal conjunctiva. No erythema. No discharge. Neck: No pain on exam. Neck supple. Cardiovascular: Normal rate, regular rhythm and normal heart sounds. Pulmonary/Chest: Effort normal and breath sounds normal. No wheezes. No rales. No rhonchi. Abdominal: Soft. Bowel sounds are normal. There is no tenderness. Musculoskeletal: she exhibits no edema.  Pain over left PSIS, ASIS. Pain with laying down/sitting up. No pain with range of motion of hip but decreased range of motion due to tight muscles. Lymphadenopathy: she has no cervical adenopathy. Neurological: she  is alert and oriented to person, place, and time. Skin: Skin is warm and dry. No rashes. Psychiatric: she  has a normal mood and affect. her behavior is normal. Thought content normal. Tearful when talking about mother's cancer. Vitals reviewed. Depression Screening and Follow-up Plan: Patient was screened for depression during today's encounter. They screened negative with a PHQ-2 score of 0.

## 2023-07-12 ENCOUNTER — APPOINTMENT (OUTPATIENT)
Dept: LAB | Age: 48
End: 2023-07-12
Payer: COMMERCIAL

## 2023-07-12 DIAGNOSIS — E53.8 LOW SERUM VITAMIN B12: ICD-10-CM

## 2023-07-12 DIAGNOSIS — E78.00 PURE HYPERCHOLESTEROLEMIA: ICD-10-CM

## 2023-07-12 DIAGNOSIS — E55.9 VITAMIN D DEFICIENCY: ICD-10-CM

## 2023-07-12 DIAGNOSIS — E66.09 CLASS 1 OBESITY DUE TO EXCESS CALORIES WITH SERIOUS COMORBIDITY AND BODY MASS INDEX (BMI) OF 33.0 TO 33.9 IN ADULT: ICD-10-CM

## 2023-07-13 ENCOUNTER — EVALUATION (OUTPATIENT)
Dept: PHYSICAL THERAPY | Facility: CLINIC | Age: 48
End: 2023-07-13
Payer: COMMERCIAL

## 2023-07-13 DIAGNOSIS — M53.3 SACRO-ILIAC PAIN: Primary | ICD-10-CM

## 2023-07-13 PROCEDURE — 97161 PT EVAL LOW COMPLEX 20 MIN: CPT

## 2023-07-13 NOTE — PROGRESS NOTES
PT Evaluation     Today's date: 2023  Patient name: Nelsy Woo  : 1975  MRN: [id number]  Referring provider: Zandra Cantrell DO  Dx:   Encounter Diagnosis     ICD-10-CM    1. Sacro-iliac pain  M53.3 Ambulatory Referral to Physical Therapy                     Assessment  Assessment details: Nelsy Woo is a 46 y.o. female who presents with signs and symptoms consistent of L sided LBP which refers into the L hip based off of subjective/objective findings as patient was + during all provacative testing. Patient presents with pain, decreased ROM, decreased joint mobility and postural dysfunction. Due to these impairments, Patient has difficulty performing a/iadls, recreational activities and engaging in social activities. Repeated flexion improved patient discomfort and allowed patient to stand up with less difficulty. Patient would benefit from a comprehensive HEP focusing on improving said deficits. Patient was educated on and provided with an at home exercise program this date to be completed. Patient verbalized understanding of the importance of completion. Patient would benefit from skilled physical therapy to address the impairments, improve their level of function, and to improve their overall quality of life. PT POC 2x/wk for 6 weeks. Thank you for this referral.    Impairments: abnormal or restricted ROM, activity intolerance, impaired physical strength, lacks appropriate home exercise program and pain with function    Symptom irritability: moderateUnderstanding of Dx/Px/POC: good   Prognosis: good    Goals  Short Term Goals: to be achieved by 4 weeks  1) Patient to be independent with basic HEP. 2) Decrease pain to 4/10 at its worst.  3) Increase lumbar spine ROM by 25% in all deficient planes. 4) Increase LE strength by 1/2 MMT grade in all deficient planes. Long Term Goals: to be achieved by discharge  1) FOTO equal to or greater than expected outcome.   2) Patient to be independent with comprehensive HEP. 3) Lumbar spine ROM WNL all planes to improve a/iadls. 4) Increase LE strength to 5/5 MMT grade in all planes to improve a/iadls. 5) Patient to report no sleep interruption secondary to pain. 6) Increase seated tolerance to 30 min. 7) Patient will be able to perform a sit to stand without increases in pain. Plan  Patient would benefit from: skilled physical therapy and PT eval  Planned therapy interventions: joint mobilization, manual therapy, massage, neuromuscular re-education, patient education, strengthening, stretching, therapeutic activities, therapeutic exercise, home exercise program, functional ROM exercises, activity modification and abdominal trunk stabilization  Frequency: 2x week  Duration in weeks: 6  Treatment plan discussed with: patient        Subjective Evaluation    History of Present Illness  Mechanism of injury: History of Current Injury: Patient reports this date following a referral from Dr. Compa Bethea, after an annual visit. Patient notes about 90 days of increased back and hip pain which radiates into the L hip when at it's worst. She notes this began after increasing her activity levels with working out and yoga. Notes she has stopped doing yoga and working out for fear of increases in pain. Finds that this back pain limits many of her daily activities and often finds it difficult to find a comfortable position when sitting. Patient will be going on vacation next week and is worried about worsening her pain. Pain location/Descriptors: L waist line and can radiate into the groin   Aggravating factors: Bending, sitting to standing, lifting, sitting  Easing factors: side lying, pressure on the back   Imaging: None   Special Questions: Daniel Coleman denies a new onset of Bladder incontinence, Bowel dysfunction, Dysphagia, Dysarthria, Diplopia, saddle anesthesia, Tingling and Numbness.   Patient goals:  Decrease pain   Hobbies/Interest: working out Occupation: Not currently working     Patient Goals  Patient goals for therapy: decreased pain, increased strength, increased motion and return to sport/leisure activities    Pain  Current pain ratin  At best pain ratin  At worst pain ratin  Quality: sharp          Objective     Palpation   Left   No palpable tenderness to the erector spinae, lumbar paraspinals and quadratus lumborum. Right   No palpable tenderness to the erector spinae, lumbar paraspinals and quadratus lumborum. Tenderness     Lumbar Spine  Tenderness in the spinous process, left transverse process and right transverse process. Left Hip   No tenderness in the PSIS. Right Hip   No tenderness in the PSIS.      Neurological Testing     Sensation     Lumbar   Left   Intact: light touch    Right   Intact: light touch    Reflexes   Left   Patellar (L4): normal (2+)  Achilles (S1): normal (2+)  Clonus sign: negative    Right   Patellar (L4): normal (2+)  Achilles (S1): normal (2+)  Clonus sign: negative    Active Range of Motion     Lumbar   Flexion:  with pain Restriction level: moderate  Extension:  Restriction level: minimal  Left lateral flexion:  Restriction level: minimal  Right lateral flexion:  Restriction level: minimal  Left rotation:  Restriction level: minimal  Right rotation:  with pain Restriction level: minimal    Joint Play     Hypomobile: L4 and L5     Pain: L4 and L5   Mechanical Assessment    Cervical      Thoracic      Lumbar    Standing flexion: repeated movements   Pain intensity: better  Lying extension: repeated movements  Pain intensity: worse    Strength/Myotome Testing     Lumbar   Left   Heel walk: normal  Toe walk: normal    Right   Heel walk: normal  Toe walk: normal    Left Hip   Planes of Motion   Flexion: 4+  Extension: 4  Abduction: 4+    Right Hip   Planes of Motion   Flexion: 4+  Extension: 4+  Abduction: 4+    Left Knee   Flexion: 5  Extension: 5    Right Knee   Flexion: 5  Extension: 5    Left Ankle/Foot   Dorsiflexion: 5  Plantar flexion: 5    Right Ankle/Foot   Dorsiflexion: 5  Plantar flexion: 5    Tests     Lumbar   Negative SIJ compression and sacroiliac distraction. Left   Negative passive SLR. Right   Negative passive SLR. Left Pelvic Girdle/Sacrum   Positive: active SLR test.   Negative: thigh thrust.     Right Pelvic Girdle/Sacrum   Negative: thigh thrust and active SLR test.     Left Hip   Positive FADIR. Negative HONG. Right Hip   Positive FADIR. Negative HONG.                  Diagnosis: LBP   Precautions:    POC Expires:   Re-evaluation Date:    FOTO Scores/Date:   Visit Count 1       Manuals 7/13       LS STM        LS Mobs        LS Gapping         Hip IR/ER Mobs        Hip extension PROM        Ther Ex        Bike         LTR HEP       Posterior pelvic tilts HEP       SLR        SL abduction        Glute bridges        Modified Quadruped rotations         Cat cow        Open Books         Worlds greatest stretch        DKTC/ SKTC HEP       Patient education        HEP creation        Neuro Re-Ed        TrA bracing        Supine tapdowns        Supine marches                                                                                                Ther Act              step ups               trx squats              Modalities

## 2023-07-14 ENCOUNTER — APPOINTMENT (OUTPATIENT)
Dept: LAB | Age: 48
End: 2023-07-14
Payer: COMMERCIAL

## 2023-07-14 ENCOUNTER — PATIENT MESSAGE (OUTPATIENT)
Dept: FAMILY MEDICINE CLINIC | Facility: CLINIC | Age: 48
End: 2023-07-14

## 2023-07-14 DIAGNOSIS — E78.00 PURE HYPERCHOLESTEROLEMIA: Primary | ICD-10-CM

## 2023-07-14 LAB
25(OH)D3 SERPL-MCNC: 39.8 NG/ML (ref 30–100)
ALBUMIN SERPL BCP-MCNC: 3.6 G/DL (ref 3.5–5)
ALP SERPL-CCNC: 85 U/L (ref 46–116)
ALT SERPL W P-5'-P-CCNC: 31 U/L (ref 12–78)
ANION GAP SERPL CALCULATED.3IONS-SCNC: 4 MMOL/L
AST SERPL W P-5'-P-CCNC: 17 U/L (ref 5–45)
BILIRUB SERPL-MCNC: 0.33 MG/DL (ref 0.2–1)
BUN SERPL-MCNC: 14 MG/DL (ref 5–25)
CALCIUM SERPL-MCNC: 9.4 MG/DL (ref 8.3–10.1)
CHLORIDE SERPL-SCNC: 110 MMOL/L (ref 96–108)
CHOLEST SERPL-MCNC: 293 MG/DL
CO2 SERPL-SCNC: 25 MMOL/L (ref 21–32)
CREAT SERPL-MCNC: 1.11 MG/DL (ref 0.6–1.3)
GFR SERPL CREATININE-BSD FRML MDRD: 58 ML/MIN/1.73SQ M
GLUCOSE P FAST SERPL-MCNC: 90 MG/DL (ref 65–99)
HDLC SERPL-MCNC: 60 MG/DL
LDLC SERPL CALC-MCNC: 192 MG/DL (ref 0–100)
NONHDLC SERPL-MCNC: 233 MG/DL
POTASSIUM SERPL-SCNC: 4.7 MMOL/L (ref 3.5–5.3)
PROT SERPL-MCNC: 7.8 G/DL (ref 6.4–8.4)
SODIUM SERPL-SCNC: 139 MMOL/L (ref 135–147)
TRIGL SERPL-MCNC: 204 MG/DL
VIT B12 SERPL-MCNC: 494 PG/ML (ref 180–914)

## 2023-07-14 PROCEDURE — 82607 VITAMIN B-12: CPT

## 2023-07-14 PROCEDURE — 80053 COMPREHEN METABOLIC PANEL: CPT

## 2023-07-14 PROCEDURE — 80061 LIPID PANEL: CPT

## 2023-07-14 PROCEDURE — 36415 COLL VENOUS BLD VENIPUNCTURE: CPT

## 2023-07-14 PROCEDURE — 82306 VITAMIN D 25 HYDROXY: CPT

## 2023-07-26 ENCOUNTER — PATIENT MESSAGE (OUTPATIENT)
Dept: FAMILY MEDICINE CLINIC | Facility: CLINIC | Age: 48
End: 2023-07-26

## 2023-07-26 DIAGNOSIS — E78.00 PURE HYPERCHOLESTEROLEMIA: Primary | ICD-10-CM

## 2023-07-26 RX ORDER — ROSUVASTATIN CALCIUM 10 MG/1
10 TABLET, COATED ORAL DAILY
Qty: 30 TABLET | Refills: 2 | Status: SHIPPED | OUTPATIENT
Start: 2023-07-26

## 2023-08-01 ENCOUNTER — OFFICE VISIT (OUTPATIENT)
Dept: PHYSICAL THERAPY | Facility: CLINIC | Age: 48
End: 2023-08-01
Payer: COMMERCIAL

## 2023-08-01 DIAGNOSIS — M53.3 SACRO-ILIAC PAIN: Primary | ICD-10-CM

## 2023-08-01 PROCEDURE — 97112 NEUROMUSCULAR REEDUCATION: CPT

## 2023-08-01 PROCEDURE — 97110 THERAPEUTIC EXERCISES: CPT

## 2023-08-01 PROCEDURE — 97140 MANUAL THERAPY 1/> REGIONS: CPT

## 2023-08-01 NOTE — PROGRESS NOTES
Daily Note     Today's date: 2023  Patient name: Jovani Thomas  : 1975  MRN: [id number]  Referring provider: Carmen Boogie DO  Dx:   Encounter Diagnosis     ICD-10-CM    1. Sacro-iliac pain  M53.3                      Subjective: Patient reports incremental improvements over her vacation with exercise and performing HEP but on the way home and over the weekend her pain returned to a similar level that she has on IE. Most aggravated by prolonged sitting and shifting as well as rotating and moving out of the car. Objective: See treatment diary below      Assessment: Tolerated treatment well. Progressed rotational movements this date to patient functional tolerance. Rotational exercises improved subjective reports of stiffness. Most difficulty noted when going to supine to sit although improvements present after activities completed. Challenged with paloff press and anti-rotation exercises. Updated HEP. Progress as able. Patient would benefit from continued PT      Plan: Continue per plan of care.       Diagnosis: LBP   Precautions:    POC Expires:   Re-evaluation Date:    FOTO Scores/Date:   Visit Count 1 2      Manuals       LS STM        LS Mobs        LS Gapping   EB      Hip IR/ER Mobs        Hip extension PROM        Ther Ex        Bike   5' L3       LTR HEP x20 ea side pball       Posterior pelvic tilts HEP       SLR        Seated ball flexion  10x ea direction       SL abduction        Glute bridges        Modified Quadruped rotations         Cat cow        Open Books   x15 ea side       Worlds greatest stretch        DKTC/ SKTC HEP 20x ball       Patient education        HEP creation        Neuro Re-Ed        TrA bracing  20x5"      Supine tapdowns        Supine marches   20x       Paloff press  2x10 btb       Stir the pot  10x CW/CCW btb ea                                                                              Ther Act              step ups               trx squats Modalities

## 2023-08-17 ENCOUNTER — APPOINTMENT (OUTPATIENT)
Dept: PHYSICAL THERAPY | Facility: CLINIC | Age: 48
End: 2023-08-17
Payer: COMMERCIAL

## 2023-08-18 DIAGNOSIS — E78.00 PURE HYPERCHOLESTEROLEMIA: ICD-10-CM

## 2023-08-18 RX ORDER — ROSUVASTATIN CALCIUM 10 MG/1
10 TABLET, COATED ORAL DAILY
Qty: 90 TABLET | Refills: 1 | Status: SHIPPED | OUTPATIENT
Start: 2023-08-18

## 2023-08-21 DIAGNOSIS — B00.1 RECURRENT COLD SORES: ICD-10-CM

## 2023-08-22 RX ORDER — VALACYCLOVIR HYDROCHLORIDE 1 G/1
TABLET, FILM COATED ORAL
Qty: 12 TABLET | Refills: 0 | Status: SHIPPED | OUTPATIENT
Start: 2023-08-22 | End: 2024-09-17

## 2023-09-26 ENCOUNTER — CLINICAL SUPPORT (OUTPATIENT)
Dept: NUTRITION | Facility: HOSPITAL | Age: 48
End: 2023-09-26
Payer: COMMERCIAL

## 2023-09-26 VITALS — HEIGHT: 68 IN | WEIGHT: 220 LBS | BODY MASS INDEX: 33.34 KG/M2

## 2023-09-26 DIAGNOSIS — E66.09 CLASS 1 OBESITY DUE TO EXCESS CALORIES WITH SERIOUS COMORBIDITY AND BODY MASS INDEX (BMI) OF 33.0 TO 33.9 IN ADULT: ICD-10-CM

## 2023-09-26 DIAGNOSIS — E78.00 PURE HYPERCHOLESTEROLEMIA: ICD-10-CM

## 2023-09-26 PROCEDURE — 97802 MEDICAL NUTRITION INDIV IN: CPT

## 2023-09-26 NOTE — PROGRESS NOTES
Nutrition Assessment Form    Patient Name: Jaime Aviles    YOB: 1975    Sex: Female     Assessment Date: 9/26/2023  Start Time: 9:00 AM Stop Time: 10:00 AM Total Minutes: 60     Data:  Present at session: Pt   Parent/Patient Concerns/reason for visit: "This is one appointment in a process"   Medical Dx/Reason for Referral: E78.00 (ICD-10-CM) - Pure hypercholesterolemia  Provider: Paloma Patiño DO   Past Medical History:   Diagnosis Date   • Anemia    • Dry eye syndrome of both eyes    • Fibroids 2014    2 cm calcified fibroid   • HELLP syndrome     she does not have HTN    • History of hemolysis, elevated liver enzymes, 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 8.5 g 1   • CALCIUM PO Take 1 Dose by mouth daily     • norethindrone-ethinyl estradiol (Loestrin Fe 1/20) 1-20 MG-MCG per tablet Take 1 tablet by mouth daily 84 tablet 4   • rosuvastatin (CRESTOR) 10 MG tablet TAKE 1 TABLET BY MOUTH EVERY DAY 90 tablet 1   • valACYclovir (VALTREX) 1,000 mg tablet TAKE TWO TABS BY MOUTH AT ONSET, THEN TWO TABS 12 HRS. LATER 12 tablet 0   • vitamin B-12 (CYANOCOBALAMIN) 500 MCG TABS Take 1 tablet (500 mcg total) by mouth daily     • VITAMIN D PO Take 1 Dose by mouth in the morning       No current facility-administered medications for this visit.      Crestor is the only medication pt is on   Additional Meds/Supplements: Up until a few months ago pt was taking a B12, multivitamin   Special Learning Needs/barriers to learning/any new barriers N/A   Height: 5'8" HC Readings from Last 5 Encounters:   No data found for [facility].      Weight: 220# Wt Readings from Last 10 Encounters:   07/11/23 99.8 kg (220 lb)   06/19/23 98.9 kg (218 lb)   06/19/23 98.9 kg (218 lb)   05/11/23 98.9 kg (218 lb)   04/13/23 99.1 kg (218 lb 6.4 oz)   03/20/23 93.4 kg (206 lb)   10/17/22 99.8 kg (220 lb)   06/22/22 98.1 kg (216 lb 3.2 oz)   06/13/22 98.9 kg (218 lb)   03/24/22 97.5 kg (215 lb)     Estimated body mass index is 33.45 kg/m² as calculated from the following:    Height as of 7/11/23: 5' 8" (1.727 m). Weight as of 7/11/23: 99.8 kg (220 lb). Recent Weight Change: Wt has been stable over the last year []Yes     [x]No  Amount:       Energy Needs: Using 84kg/185#-long term goal wt  8198-3879 kcal (20-25 kcal/kg)  87-84 g protein (0.8-1.0 g/kg)  2520 mL (30 mL/kg     No Known Allergies or intolerances NKFA but sometimes foods that produce more gas & milk upsets stomach. Social History     Substance and Sexual Activity   Alcohol Use Yes   • Alcohol/week: 2.0 standard drinks of alcohol   • Types: 2 Cans of beer per week    Comment: 1 glass few times a week entertaining     N/A   Social History     Tobacco Use   Smoking Status Never   Smokeless Tobacco Never    N/A   Who shops? patient   Who cooks/cooking methods/Eating out/take out habits   patient     Exercise: Starts new exercise program 2 days/week plus 2 other days of exercise at home (has [employer] bike, walking)   Other: ie: Sleep habits/ stress level/ work habits household-lives with ?/ food security Cares for he rmom with stage 4 cancer-stressful. Otherwise limited stress in life. Prior Nutritional Counseling?  []Yes     [x]No  When:      Why:         Diet Hx:  Breakfast: eats daily Avocado toast with egg on top (when home)  Or  h[employer] (when eating out)   Lunch: sometimes skips when goes out for breakfast *healthier meal as per pt  Salad: lettuce, carrots, feta or shredded cheese, turkey & cheese roll up or some other protein w/ balsamic dressing or olive oil vinaigrette + chips & salsa or apple & peanut butter       Dinner: often eating later. Crock pot-potrost with potatoes & carrots  Or  mone take out           Snacks: Daytime snacks: chips and salsa, popables  Night: sweet tooth-individual pudding cups   Other Notes/ Initial Assessment:      Pt feels since she hit an issue with not being able to lose wt, being more tired. Pt has been going to a facility called "[facility]"-its a gym that is more personalized, maybe 5 people to 1 . Household: Pt, , 15 yo daughter. Eats a regular diet. Pt with IBS-has to watch very gassy foods. Pt states they are often eating on the go d/t child's schedule, busy with caring for mom. Pt is not working now d/t taking care of mom-resigned ~ 1 year ago. Fluids: mainly water, 4 cans of diet coke/day (2 in am, 2 in pm), sometimes unsweetened  tea, rarely sweet eat. Rarely milk-upsets stomach-can tolerate cheese & yogurt. Pt is observed to have adequate fluids. Breakfast is often eating out after dropping off daughter at school. Lunches are often the healthier meal. Dinner as per pt "when things go crazy"-her daughter has a hectic sports schedule so often grabs something on the go.  loves meat, daughter loves pasta & mac & cheese. As per diet recall, meeting: pt's diet is observed to be high in take out/foods prepared outside of the household contributing high amounts of fat/kcal/saturated fat to diet. Also low in fiber/fruits/vegetables. Pt feels her eating habits became worse over the last year when starting to take care of her mom and since her daughters sports schedule became more hective-more eating out, less meal prep. Provided pt with restaurant guide for healthier choices when eating out, suggested pt look at the nutrition information for common places she gets take out for healthier choices. Discussed recommended amount of fiber and fiber rich foods.  Provided verbal and written nutrition education on heart health/lowering LDL/cholesterol nutrition education. 07/2023 Lipid panel-F/U in October for new labs            Pt will call if would like to schedule a follow up. Nutrition Diagnosis:   Altered Nutrition-Related Laboratory values  related to HLD as evidenced by  Elevated cholesterol/triglycerides,        Any change or new dx since previous visit:     Nutrition Diagnosis:   Overweight/obesity  related to Excess energy intake overtime as evidenced by  BMI more than normative standard for age and sex (obesity-grade I 32-30. 9)      Medical Nutrition Therapy Intervention:  [x]Individualized Meal Plan:   Using 84kg/185#-long term goal wt  6359-0608 kcal (20-25 kcal/kg)  87-84 g protein (0.8-1.0 g/kg)  2520 mL (30 mL/kg [x]Understanding Lab Values: Lipid panel   []Basic Pathophysiology of Disease []Food/Medication Interactions   []Food Diary [x]Exercise: Recommend 150 min/week   [x]Lifestyle/Behavior Modification Techniques: making healthier take out choices []Medication, Mechanism of Action   [x]Label Reading: Fiber, saturated fat, cholesterol []Self Blood Glucose Monitoring   [x]Weight/BMI Goals: gain/lose/maintain: 209# (5% wt loss) short term goal, 185# long term goal []Other -           Comprehension: []Excellent  [x]Very Good  []Good  []Fair   []Poor    Receptivity: []Excellent  [x]Very Good  []Good  []Fair   []Poor    Expected Compliance: []Excellent  [x]Very Good  []Good  []Fair   []Poor        Goals:   1. Create a 500-750 kcal deficit   2. Consume 25 g fiber per day   3.        Labs:  CMP  Lab Results   Component Value Date    K 4.7 07/14/2023     (H) 07/14/2023    CO2 25 07/14/2023    BUN 14 07/14/2023    CREATININE 1.11 07/14/2023    GLUF 90 07/14/2023    CALCIUM 9.4 07/14/2023    CORRECTEDCA 9.9 06/06/2022    AST 17 07/14/2023    ALT 31 07/14/2023    ALKPHOS 85 07/14/2023    EGFR 58 07/14/2023       BMP  Lab Results   Component Value Date    CALCIUM 9.4 07/14/2023    K 4.7 07/14/2023    CO2 25 07/14/2023     (H) 07/14/2023    BUN 14 07/14/2023    CREATININE 1.11 07/14/2023       Lipids  Lab Results   Component Value Date    CHOL 199 11/27/2013     Lab Results   Component Value Date    HDL 60 07/14/2023    HDL 66 06/06/2022    HDL 71 03/22/2021     Lab Results   Component Value Date    LDLCALC 192 (H) 07/14/2023    LDLCALC 147 (H) 06/06/2022    LDLCALC 170 (H) 03/22/2021     Lab Results   Component Value Date    TRIG 204 (H) 07/14/2023    TRIG 164 (H) 06/06/2022    TRIG 167 (H) 03/22/2021     No results found for: "CHOLHDL"    Hemoglobin A1C  Lab Results   Component Value Date    HGBA1C 5.3 11/27/2013       Fasting Glucose  Lab Results   Component Value Date    GLUF 90 07/14/2023       Insulin     Thyroid  No results found for: "TSH", "W3OPSLR", "G1HDZHZ", "THYROIDAB"    Hepatic Function Panel  Lab Results   Component Value Date    ALT 31 07/14/2023    AST 17 07/14/2023    ALKPHOS 85 07/14/2023       Celiac Disease Antibody Panel  No results found for: "ENDOMYSIAL IGA", "GLIADIN IGA", "GLIADIN IGG", "IGA", "TISSUE TRANSGLUT AB", "TTG IGA"   Iron  Lab Results   Component Value Date    IRON 92 06/06/2022    TIBC 413 06/06/2022    FERRITIN 56 06/06/2022            Latonia Jimenez RD, LDN   [address]

## 2023-10-13 ENCOUNTER — TELEPHONE (OUTPATIENT)
Dept: FAMILY MEDICINE CLINIC | Facility: CLINIC | Age: 48
End: 2023-10-13

## 2023-10-13 ENCOUNTER — APPOINTMENT (OUTPATIENT)
Dept: LAB | Age: 48
End: 2023-10-13
Payer: COMMERCIAL

## 2023-10-13 DIAGNOSIS — E78.00 PURE HYPERCHOLESTEROLEMIA: ICD-10-CM

## 2023-10-13 DIAGNOSIS — E78.00 PURE HYPERCHOLESTEROLEMIA: Primary | ICD-10-CM

## 2023-10-13 LAB
ALBUMIN SERPL BCP-MCNC: 4.1 G/DL (ref 3.5–5)
ALP SERPL-CCNC: 85 U/L (ref 34–104)
ALT SERPL W P-5'-P-CCNC: 54 U/L (ref 7–52)
ANION GAP SERPL CALCULATED.3IONS-SCNC: 10 MMOL/L
AST SERPL W P-5'-P-CCNC: 42 U/L (ref 13–39)
BILIRUB SERPL-MCNC: 0.47 MG/DL (ref 0.2–1)
BUN SERPL-MCNC: 15 MG/DL (ref 5–25)
CALCIUM SERPL-MCNC: 9.3 MG/DL (ref 8.4–10.2)
CHLORIDE SERPL-SCNC: 103 MMOL/L (ref 96–108)
CHOLEST SERPL-MCNC: 213 MG/DL
CO2 SERPL-SCNC: 25 MMOL/L (ref 21–32)
CREAT SERPL-MCNC: 0.87 MG/DL (ref 0.6–1.3)
GFR SERPL CREATININE-BSD FRML MDRD: 77 ML/MIN/1.73SQ M
GLUCOSE P FAST SERPL-MCNC: 90 MG/DL (ref 65–99)
HDLC SERPL-MCNC: 60 MG/DL
LDLC SERPL CALC-MCNC: 117 MG/DL (ref 0–100)
NONHDLC SERPL-MCNC: 153 MG/DL
POTASSIUM SERPL-SCNC: 4.2 MMOL/L (ref 3.5–5.3)
PROT SERPL-MCNC: 7.3 G/DL (ref 6.4–8.4)
SODIUM SERPL-SCNC: 138 MMOL/L (ref 135–147)
TRIGL SERPL-MCNC: 180 MG/DL

## 2023-10-13 PROCEDURE — 80061 LIPID PANEL: CPT

## 2023-10-13 PROCEDURE — 80053 COMPREHEN METABOLIC PANEL: CPT

## 2023-10-13 PROCEDURE — 36415 COLL VENOUS BLD VENIPUNCTURE: CPT

## 2023-10-16 ENCOUNTER — TELEMEDICINE (OUTPATIENT)
Dept: FAMILY MEDICINE CLINIC | Facility: CLINIC | Age: 48
End: 2023-10-16
Payer: COMMERCIAL

## 2023-10-16 DIAGNOSIS — E78.00 PURE HYPERCHOLESTEROLEMIA: Primary | ICD-10-CM

## 2023-10-16 DIAGNOSIS — E66.09 CLASS 1 OBESITY DUE TO EXCESS CALORIES WITH SERIOUS COMORBIDITY AND BODY MASS INDEX (BMI) OF 33.0 TO 33.9 IN ADULT: ICD-10-CM

## 2023-10-16 DIAGNOSIS — Z80.0 FAMILY HISTORY OF LIVER CANCER: ICD-10-CM

## 2023-10-16 DIAGNOSIS — R74.01 ELEVATED AST (SGOT): ICD-10-CM

## 2023-10-16 PROBLEM — E53.8 B12 DEFICIENCY: Status: ACTIVE | Noted: 2023-10-16

## 2023-10-16 PROCEDURE — 99215 OFFICE O/P EST HI 40 MIN: CPT | Performed by: FAMILY MEDICINE

## 2023-10-16 NOTE — PROGRESS NOTES
Virtual Regular Visit    Assessment/Plan:   1. Pure hypercholesterolemia  Assessment & Plan:   chol 293 to 213,  to 180, HDL 60,  to 117  (Crestor 10 mg)       2. Elevated AST (SGOT)  Assessment & Plan:   ast 17 to 42, alt 31 to 54, Likely from Crestor. Recheck in 4 weeks. If going up consider d/c Crestor. Will also check liver US to r/o fatty liver. Orders:  -     US abdomen complete; Future; Expected date: 10/16/2023  -     Hepatic function panel; Future    3. Family history of liver cancer  Assessment & Plan:  See above. Believes this started as fatty liver. Orders:  -     US abdomen complete; Future; Expected date: 10/16/2023  -     Hepatic function panel; Future    4. Class 1 obesity due to excess calories with serious comorbidity and body mass index (BMI) of 33.0 to 33.9 in adult  Assessment & Plan:  - recommend weogovy or Saxenda for weight loss and heart benefits with high cholesterol. Pt has not had success with weight loss with healthy diet and exercise alone, but will continue to work on these. she  has not been on any weight loss medicines in the past 12 months  There is no height or weight on file to calculate BMI.   This is a new start - she is not on another GLP medication currently or in the past 12 months   No personal or family history of thyroid cancer  No personal history of pancreatitis  A GLP medication is recommended for improvement in weight and comorbidities associated with obesity   Risks and benefits and side effects of medication reviewed   Follow up 3 months after starting GLP             Return in 9 months (on 7/13/2024) for Annual physical.    Reason for visit is   Chief Complaint   Patient presents with    Follow-up     Lab review      Encounter provider Manuel Whiting DO  Provider located at [address] [place] [id number] [phone]    Recent Visits  Date Type Provider Dept   10/13/23 Telephone DO Igor Bell   Showing recent visits within past 7 days and meeting all other requirements  Today's Visits  Date Type Provider Dept   10/16/23 Telemedicine DO Igor Bell   Showing today's visits and meeting all other requirements  Future Appointments  No visits were found meeting these conditions. Showing future appointments within next 150 days and meeting all other requirements       The patient was identified by name and date of birth. Neena Frey was informed that this is a telemedicine visit and that the visit is being conducted through the [employer]. She agrees to proceed. .  My office door was closed. No one else was in the room. She acknowledged consent and understanding of privacy and security of the video platform. The patient has agreed to participate and understands they can discontinue the visit at any time. Patient is currently located in the [facility]  Patient is currently located in a state in which I am licensed    Patient is aware this is a billable service. Mumtaz Heck is a 46 y.o. female is being seen via Video Visit today due to the COVID-19 pandemic. Chief Complaint   Patient presents with    Follow-up     Lab review        Today's concerns are:    Mom in post acute rehab - in and out of hospital. Stopped eating. Lost 30 pounds in 6 weeks. Going back to [place]. No treatment since July brother here last week and a half. Weight has been the same. Joined a [employer] [facility] in [facility]. Fast food eating right now. Did meet with dietician. There were no vitals filed for this visit.   Wt Readings from Last 3 Encounters:   09/26/23 99.8 kg (220 lb)   07/11/23 99.8 kg (220 lb)   06/19/23 98.9 kg (218 lb)     BP Readings from Last 3 Encounters:   07/11/23 118/64   06/19/23 116/72   06/19/23 132/86       PHQ-2/9 Depression Screening    Little interest or pleasure in doing things: 0 - not at all  Feeling down, depressed, or hopeless: 1 - several days  PHQ-2 Score: 1  PHQ-2 Interpretation: Negative depression screen          Past Medical History:   Diagnosis Date    Anemia     Dry eye syndrome of both eyes     Fibroids     2 cm calcified fibroid    HELLP syndrome     she does not have HTN     History of hemolysis, elevated liver enzymes, and low platelet (HELLP) syndrome 2009    History of infertility     IVF/ICSI    Irregular menses     Pulmonic regurgitation 2023    Tricuspid regurgitation 2023     Past Surgical History:   Procedure Laterality Date     SECTION      COLONOSCOPY      in [place] - normal.     COLONOSCOPY      ENDOMETRIAL BIOPSY  2014    proliferative endometrium    KNEE ARTHROSCOPY Right        Current Medications:  No current outpatient medications on file. No current facility-administered medications for this visit. Allergies:  No Known Allergies    Review of Systems  all others negative - no chest pain, SOB, normal urine and bowels. no GERD. sleeping well. mood stable - mom with cancer, she is primary caregiver. Physical Exam   Video Exam Pt not examined in person - seen over epic virtual video visit   Constitutional:  she appears well-developed and well-nourished. HENT: Head: Normocephalic. Right Ear: External ear normal.   Left Ear: External ear normal.   Nose: Nose normal.   Eyes: Pupils are equal, round, and reactive to light. Right eye exhibits no discharge. Left eye exhibits no discharge. No scleral icterus. Neck: Normal range of motion. Pulmonary/Chest: Effort normal. No respiratory distress. Neurological: she is alert and oriented to person, place, and time. Skin: Skin is warm and dry on face - no rashes. Not pale. Not diaphoretic. Psychiatric: she  has a normal mood and affect.  she behavior is normal. Thought content normal.       As a result of this visit, I have not referred the patient for further respiratory evaluation. VIRTUAL VISIT DISCLAIMER    Timi Bravo acknowledges that she has consented to an online visit or consultation. She understands that the online visit is based solely on information provided by her, and that, in the absence of a face-to-face physical evaluation by the physician, the diagnosis she receives is both limited and provisional in terms of accuracy and completeness. This is not intended to replace a full medical face-to-face evaluation by the physician. Timi Bravo understands and accepts these terms. BMI Counseling: There is no height or weight on file to calculate BMI. The BMI is above normal. Nutrition recommendations include decreasing portion sizes. Exercise recommendations include exercising 3-5 times per week. Rationale for BMI follow-up plan is due to patient being overweight or obese. Depression Screening and Follow-up Plan: Patient was screened for depression during today's encounter. They screened negative with a PHQ-2 score of 1.    5 minutes spent on chart prep, 40 minutes spent with patient counseling/educating on their diagnoses, tests completed and any new tests ordered, any referrals placed, treatment options, and documentation of above today. In prescribing new medications, or changing doses, we reviewed the risks and benefits and side effects of these medications along with other treatment options if appropriate.

## 2023-10-16 NOTE — PATIENT INSTRUCTIONS
Weight Loss Medications - please read carefully   Varun Fung, *Wegovy are FDA approved for weight loss with a prior authorization. Call to see if these are approved by your insurance. We have the additional options of Ozempic or Mounjaro if you have type 2 diabetes only. Call to see if one of the above medications are covered by your insurance. Next call around to see if any pharmacies have the medication in stock. We cannot fill it until you know they have it in stock. Next send us a [employer] message to let us know which one so we can send it in. The medication will need to be increased either weekly (for Saxenda) or monthly (for the others), so we cannot send it to a mail away pharmacy until you get to the maintenance dose. Once  the prescription gets sent in we will need to get a prior auth from [employer]. Allow 1 - 2 weeks for this process. If you have not hear anything regarding approval or denial from our office by this time, please reach out to us. Once you start the medication you will need to contact our office every 2 - 3 weeks in order to get the next dose. Let us know your current dose and what pharmacy you want it sent to. You can call or send a [employer] message. Do not send a Medication Refill message, or have the pharmacy contact us,  or the last dose will be refilled automatically. Vaurn Fung  This is how to start Varun Morelond. It is a daily injection to help with weight loss. WEEK 1: 0.6mg daily  -if tolerated,  WEEK 2: 1.2mg daily  -if tolerated,  WEEK 3: 1.8mg daily  -if tolerated,  WEEK 4: 2.4mg daily  -if tolerated,  WEEK 5: 3mg daily and then continue at this dose     If you miss more than 3 days of the medication you should restart this from the beginning. VISIT SAXENDA. COM to see about coupons, how to give yourself injections, and more information     If you develop nausea or vomiting, STOP the medication for a few days, then DECREASE to the previous dose that you tolerated well. Stay well hydrated. You can inject in your stomach, upper leg or upper arm   Unused pens should be stored in the refrigerator, but the pen in use can he kept at room temperature for up to 30 days   If you develop severe abdominal pain, pain radiating from your abdomen to the back, or fever associated with abdominal pain/vomiting, please call or go to the ER as this can be a sign of pancreatitis which can be an adverse effect of the medication. Action: Cause slower gastric emptying which will increase feeling full with meals. This feeling fullness will allow for you to reduce calorie intake sooner than usual and begin to lose some weight. These medications also will reduce production of glucose form your liver to allow for better control of your sugars. Reminders: These medications do not cause low blood sugars. Keep in the refrigerator until you use it once, then keep it out of refrigerator. You ONLY need to prime the pen upon open it monthly. Clean your hands and site of injection to avoid infection risks. Store used needles in old plastic laundry detergent bin or coffee bin, then tape it when full and discard in garbage. Side Effects:  Common side effects include full sensation with eating, nausea which usually improves over the first 1-2 weeks. Soreness, redness or lump at site of injection. Dangers:  Pancreatitis can happen with this medicine for some people; therefore if you have ever had pancreatitis or have severe nausea vomiting and abdominal pain while on this medicine stop it immediately and call us or go to ER for assistance. Do not use if you have had a history of thyroid cancer or a family history of MEN syndrome. If you are diabetic and use this medication with insulin or sulfonylureas there is a risk of low blood sugar. Monitor your sugars more closely. If you are not able to urinate properly this could be a sign of a kidney problem. Let us know right away.  In the clinical trials there were issues with gallbladder problems like gallstones. Let us know if you develop severe abdominal pain. If you have new depression or thoughts of suicide you should also contact us right away, and call 911 or go to your nearest emergency room. Marquita crest   MXAZWI dosing:  Week 1 - 4:  0.25 mg weekly   Week 5 - 8:  0.5 mg weekly   Week 9 - 12:  1 mg weekly   Week 13 - 16:  1.7 mg weekly   Maintenance from there:  2.4 mg weekly. Marquita crest is a WEEKLY non-insulin injectable. GLP-1Agonist  Semaglutide [facility]) FDA approved in 2014 as adjunct to reduced calorie diet and increased physical activity for chronic weight management in adults with initial BMI of ? 30 kg/m2 or ? 27 kg/m2 with ? 1 weight-related comorbid condition. There were several studies run over 68 week periods of time. During this time they diabetic patients lose on average 6.2% of their body weight. In a different trial involving non-diabetics the weight loss was closer to 12 - 15%. Action: Cause slower gastric emptying which will increase feeling full with meals. This feeling fullness will allow for you to reduce calorie intake sooner than usual and begin to lose some weight. These medications also will reduce production of glucose form your liver to allow for better control of your sugars. Reminders: These medications do not cause low blood sugars. Keep in the refrigerator until you use it once, then keep it out of refrigerator. Clean your hands and site of injection to avoid infection risks. Store used needles in old plastic laundry detergent bin or coffee bin, then tape it when full and discard in garbage. Side Effects:  Common side effects include full sensation with eating, nausea which usually improves over the first 1-2 weeks. Soreness, redness or lump at site of injection. Constipation, stomach pain, headache, fatigue, indigestion, dizziness, bloating, burping.       Dangers:  Pancreatitis can happen with this medicine for some people; therefore if you have ever had pancreatitis or have severe nausea vomiting and abdominal pain while on this medicine stop it immediately and call us or go to ER for assistance. Similarly gallbladder disease. Do not use if you have had a history of thyroid canceror a family history of MEN syndrome. It can cause low blood sugar if mixed with certain other diabetes medications. If you have type 2 diabetes you should already be seeing an eye doctor - let them know you are on this medication and contact them if there are changes in your vision. See the package insert for all serious possible side effects           Ozempic  How to start Ozempic  Month 1:  0.25 mg weekly x 4 weeks   Month 2:  0.5 mg weekly x 4 weeks   Month 3:  1 mg weekly x 4 weeks   Month 4:  2 mg weekly thereafter     Go to the Ozempic or GoodRx website to look coupons to save on this medication. About the Ozempic® Pen  Ozempic® (semaglutide) injection 0.5 mg or 1 mg   Ozempic PI ([email or web address])     Ozempic is a  WEEKLY non-insulin injectable. GLP-1Agonist  Victoza (liraglutide) daily, Byetta (exenatide) twice daily, Adlyxin (lixisenatide)  Trulicity (dulaglutide) Bydureon(Exenatide) , Ozempic (semaglutide), Rybelsus (semaglutide),          Action: Cause slower gastric emptying which will increase feeling full with meals. This feeling fullness will allow for you to reduce calorie intake sooner than usual and begin to lose some weight. These medications also will reduce production of glucose form your liver to allow for better control of your sugars. Reminders: These medications do not cause low blood sugars. Keep in the refrigerator until you use it once, then keep it out of refrigerator. Clean your hands and site of injection to avoid infection risks. Store used needles in old plastic laundry detergent bin or coffee bin, then tape it when full and discard in garbage.       Side Effects: Common side effects include full sensation with eating, nausea which usually improves over the first 1-2 weeks. Soreness, redness or lump at site of injection. Dangers:  Pancreatitis can happen with this medicine for some people; therefore if you have ever had pancreatitis or have severe nausea vomiting and abdominal pain while on this medicine stop it immediately and call us or go to ER for assistance. Do not use if you have had a history of thyroid canceror a family history of MEN syndrome. Mounjarno   Mounjaro (tirzepatide) dosing instructions  Month 1 -  2.5 mg weekly x 4 weeks   Month 2 - 5 mg weekly x 4 weeks  Month 3 - 7.5 mg weekly x 4 week   Month 4 - 10 mg weekly x 4 weeks  Month 5 - 12.5 mg weekly x 4 weeks   Week 6 and thereafter - 15 mg weekly     You can inject this in your stomach, thigh, or upper arm. For savings card program go to [employer] or  [id number] ([phone])  Common side effects  nausea, diarrhea, decreased appetite, vomiting, constipation, indigestion, and stomach pain. To help these try eating smaller meals, stop eating when you feel full, avoid eating fat or fatty foods, try eating bland foods like toast, crackers or rice. If you take birth control pills by mouth these may not work as well while you are on this medication and you may want to increase your dose or use another form of birth control. Contraindicated with medullary thyroid carcinoma MTC or MEN2. It may cause tumors of the thyroid, including thyroid cancer. Call right away and stop medication if severe pain in stomach with or without vomiting (pancreatitis)   Watch for low blood sugar if taking this medication with a sulfanuria or insulin. Drink plenty of fluids to avoid dehydration. If you having gallbladder issues such as pain in your upper abdomen, yellowing of your skin, fever, dar colored stools, stop this medicine can all your doctor.    Call if you have sudden change in your vision.

## 2023-10-16 NOTE — ASSESSMENT & PLAN NOTE
ast 17 to 42, alt 31 to 54, Likely from Crestor. Recheck in 4 weeks. If going up consider d/c Crestor. Will also check liver US to r/o fatty liver.

## 2023-10-16 NOTE — ASSESSMENT & PLAN NOTE
Weight Loss Medications - please read carefully   Evorn Spell are FDA approved for weight loss with a prior authorization. Call to see if these are approved by your insurance. We have the additional options of Ozempic or Mounjaro if you have type 2 diabetes only. Call to see if one of the above medications are covered by your insurance. Next call around to see if any pharmacies have the medication in stock. We cannot fill it until you know they have it in stock. Next send us a [employer] message to let us know which one so we can send it in. The medication will need to be increased either weekly (for Saxenda) or monthly (for the others), so we cannot send it to a mail away pharmacy until you get to the maintenance dose. Once  the prescription gets sent in we will need to get a prior auth from [employer]. Allow 1 - 2 weeks for this process. If you have not hear anything regarding approval or denial from our office by this time, please reach out to us. Once you start the medication you will need to contact our office every 2 - 3 weeks in order to get the next dose. Let us know your current dose and what pharmacy you want it sent to. You can call or send a [employer] message. Do not send a Medication Refill message, or have the pharmacy contact us,  or the last dose will be refilled automatically. Edbo Kiersten  This is how to start Debo Kiersten. It is a daily injection to help with weight loss. WEEK 1: 0.6mg daily  -if tolerated,  WEEK 2: 1.2mg daily  -if tolerated,  WEEK 3: 1.8mg daily  -if tolerated,  WEEK 4: 2.4mg daily  -if tolerated,  WEEK 5: 3mg daily and then continue at this dose     If you miss more than 3 days of the medication you should restart this from the beginning. VISIT SAXENDA. COM to see about coupons, how to give yourself injections, and more information     If you develop nausea or vomiting, STOP the medication for a few days, then DECREASE to the previous dose that you tolerated well. Stay well hydrated. You can inject in your stomach, upper leg or upper arm   Unused pens should be stored in the refrigerator, but the pen in use can he kept at room temperature for up to 30 days   If you develop severe abdominal pain, pain radiating from your abdomen to the back, or fever associated with abdominal pain/vomiting, please call or go to the ER as this can be a sign of pancreatitis which can be an adverse effect of the medication. Action: Cause slower gastric emptying which will increase feeling full with meals. This feeling fullness will allow for you to reduce calorie intake sooner than usual and begin to lose some weight. These medications also will reduce production of glucose form your liver to allow for better control of your sugars. Reminders: These medications do not cause low blood sugars. Keep in the refrigerator until you use it once, then keep it out of refrigerator. You ONLY need to prime the pen upon open it monthly. Clean your hands and site of injection to avoid infection risks. Store used needles in old plastic laundry detergent bin or coffee bin, then tape it when full and discard in garbage. Side Effects:  Common side effects include full sensation with eating, nausea which usually improves over the first 1-2 weeks. Soreness, redness or lump at site of injection. Dangers:  Pancreatitis can happen with this medicine for some people; therefore if you have ever had pancreatitis or have severe nausea vomiting and abdominal pain while on this medicine stop it immediately and call us or go to ER for assistance. Do not use if you have had a history of thyroid cancer or a family history of MEN syndrome. If you are diabetic and use this medication with insulin or sulfonylureas there is a risk of low blood sugar. Monitor your sugars more closely. If you are not able to urinate properly this could be a sign of a kidney problem. Let us know right away.  In the clinical trials there were issues with gallbladder problems like gallstones. Let us know if you develop severe abdominal pain. If you have new depression or thoughts of suicide you should also contact us right away, and call 911 or go to your nearest emergency room. [facility]   QJBGIR dosing:  Week 1 - 4:  0.25 mg weekly   Week 5 - 8:  0.5 mg weekly   Week 9 - 12:  1 mg weekly   Week 13 - 16:  1.7 mg weekly   Maintenance from there:  2.4 mg weekly. [facility] is a WEEKLY non-insulin injectable. GLP-1Agonist  Semaglutide [facility]) FDA approved in 2014 as adjunct to reduced calorie diet and increased physical activity for chronic weight management in adults with initial BMI of ? 30 kg/m2 or ? 27 kg/m2 with ? 1 weight-related comorbid condition. There were several studies run over 68 week periods of time. During this time they diabetic patients lose on average 6.2% of their body weight. In a different trial involving non-diabetics the weight loss was closer to 12 - 15%. Action: Cause slower gastric emptying which will increase feeling full with meals. This feeling fullness will allow for you to reduce calorie intake sooner than usual and begin to lose some weight. These medications also will reduce production of glucose form your liver to allow for better control of your sugars. Reminders: These medications do not cause low blood sugars. Keep in the refrigerator until you use it once, then keep it out of refrigerator. Clean your hands and site of injection to avoid infection risks. Store used needles in old plastic laundry detergent bin or coffee bin, then tape it when full and discard in garbage. Side Effects:  Common side effects include full sensation with eating, nausea which usually improves over the first 1-2 weeks. Soreness, redness or lump at site of injection. Constipation, stomach pain, headache, fatigue, indigestion, dizziness, bloating, burping.       Dangers:  Pancreatitis can happen with this medicine for some people; therefore if you have ever had pancreatitis or have severe nausea vomiting and abdominal pain while on this medicine stop it immediately and call us or go to ER for assistance. Similarly gallbladder disease. Do not use if you have had a history of thyroid canceror a family history of MEN syndrome. It can cause low blood sugar if mixed with certain other diabetes medications. If you have type 2 diabetes you should already be seeing an eye doctor - let them know you are on this medication and contact them if there are changes in your vision. See the package insert for all serious possible side effects           Ozempic  How to start Ozempic  Month 1:  0.25 mg weekly x 4 weeks   Month 2:  0.5 mg weekly x 4 weeks   Month 3:  1 mg weekly x 4 weeks   Month 4:  2 mg weekly thereafter     Go to the Ozempic or GoodRx website to look coupons to save on this medication. About the Ozempic® Pen  Ozempic® (semaglutide) injection 0.5 mg or 1 mg   Ozempic PI ([email or web address])     Ozempic is a  WEEKLY non-insulin injectable. GLP-1Agonist  Victoza (liraglutide) daily, Byetta (exenatide) twice daily, Adlyxin (lixisenatide)  Trulicity (dulaglutide) Bydureon(Exenatide) , Ozempic (semaglutide), Rybelsus (semaglutide),          Action: Cause slower gastric emptying which will increase feeling full with meals. This feeling fullness will allow for you to reduce calorie intake sooner than usual and begin to lose some weight. These medications also will reduce production of glucose form your liver to allow for better control of your sugars. Reminders: These medications do not cause low blood sugars. Keep in the refrigerator until you use it once, then keep it out of refrigerator. Clean your hands and site of injection to avoid infection risks. Store used needles in old plastic laundry detergent bin or coffee bin, then tape it when full and discard in garbage.       Side Effects: Common side effects include full sensation with eating, nausea which usually improves over the first 1-2 weeks. Soreness, redness or lump at site of injection. Dangers:  Pancreatitis can happen with this medicine for some people; therefore if you have ever had pancreatitis or have severe nausea vomiting and abdominal pain while on this medicine stop it immediately and call us or go to ER for assistance. Do not use if you have had a history of thyroid canceror a family history of MEN syndrome. Mounjarno   Mounjaro (tirzepatide) dosing instructions  Month 1 -  2.5 mg weekly x 4 weeks   Month 2 - 5 mg weekly x 4 weeks  Month 3 - 7.5 mg weekly x 4 week   Month 4 - 10 mg weekly x 4 weeks  Month 5 - 12.5 mg weekly x 4 weeks   Week 6 and thereafter - 15 mg weekly     You can inject this in your stomach, thigh, or upper arm. For savings card program go to [employer] or  [id number] ([phone])  Common side effects  nausea, diarrhea, decreased appetite, vomiting, constipation, indigestion, and stomach pain. To help these try eating smaller meals, stop eating when you feel full, avoid eating fat or fatty foods, try eating bland foods like toast, crackers or rice. If you take birth control pills by mouth these may not work as well while you are on this medication and you may want to increase your dose or use another form of birth control. Contraindicated with medullary thyroid carcinoma MTC or MEN2. It may cause tumors of the thyroid, including thyroid cancer. Call right away and stop medication if severe pain in stomach with or without vomiting (pancreatitis)   Watch for low blood sugar if taking this medication with a sulfanuria or insulin. Drink plenty of fluids to avoid dehydration. If you having gallbladder issues such as pain in your upper abdomen, yellowing of your skin, fever, dar colored stools, stop this medicine can all your doctor.    Call if you have sudden change in your vision.

## 2023-10-16 NOTE — ASSESSMENT & PLAN NOTE
- recommend weogovy or Saxenda for weight loss and heart benefits with high cholesterol. Pt has not had success with weight loss with healthy diet and exercise alone, but will continue to work on these. she  has not been on any weight loss medicines in the past 12 months  There is no height or weight on file to calculate BMI.   This is a new start - she is not on another GLP medication currently or in the past 12 months   No personal or family history of thyroid cancer  No personal history of pancreatitis  A GLP medication is recommended for improvement in weight and comorbidities associated with obesity   Risks and benefits and side effects of medication reviewed   Follow up 3 months after starting GLP

## 2023-10-23 ENCOUNTER — ANNUAL EXAM (OUTPATIENT)
Dept: OBGYN CLINIC | Facility: CLINIC | Age: 48
End: 2023-10-23
Payer: COMMERCIAL

## 2023-10-23 VITALS
SYSTOLIC BLOOD PRESSURE: 118 MMHG | HEIGHT: 68 IN | BODY MASS INDEX: 33.68 KG/M2 | DIASTOLIC BLOOD PRESSURE: 70 MMHG | WEIGHT: 222.2 LBS

## 2023-10-23 DIAGNOSIS — Z12.31 ENCOUNTER FOR SCREENING MAMMOGRAM FOR MALIGNANT NEOPLASM OF BREAST: ICD-10-CM

## 2023-10-23 DIAGNOSIS — Z01.419 ENCOUNTER FOR ANNUAL ROUTINE GYNECOLOGICAL EXAMINATION: Primary | ICD-10-CM

## 2023-10-23 PROCEDURE — G0145 SCR C/V CYTO,THINLAYER,RESCR: HCPCS | Performed by: OBSTETRICS & GYNECOLOGY

## 2023-10-23 PROCEDURE — S0612 ANNUAL GYNECOLOGICAL EXAMINA: HCPCS | Performed by: OBSTETRICS & GYNECOLOGY

## 2023-10-23 RX ORDER — ROSUVASTATIN CALCIUM 5 MG/1
5 TABLET, COATED ORAL DAILY
COMMUNITY

## 2023-10-23 NOTE — PROGRESS NOTES
Assessment/Plan:  Pap smear done for cytology, reflex HPV. Encouraged self breast examination as well as calcium supplementation. Continue annual mammogram.  Reviewed colon cancer screening, up-to-date. Reviewed signs and symptoms of perimenopause. She will keep menstrual diary. She will continue to follow-up with primary care as scheduled. Return to office in 1 year or as needed  No problem-specific Assessment & Plan notes found for this encounter. Diagnoses and all orders for this visit:    Encounter for annual routine gynecological examination    Encounter for screening mammogram for malignant neoplasm of breast    Other orders  -     rosuvastatin (CRESTOR) 5 mg tablet; Take 5 mg by mouth daily          Subjective:      Patient ID: Renetta Dean is a 46 y.o. female. HPI    This is a very pleasant 23-year-old female G3, P3 ( x1, age 13) presents for GYN exam.  She discontinued her birth control pills last month. She was on OCPs since her delivery due to heavy menses. She was diagnosed with elevated LFTs as well as hyperlipidemia. Her cycles are regular every 4 weeks lasting 5 days on the birth control pill. There is been no changes in bowel or bladder function. She has been in a monogamous relationship with her  for over 20 years. Pap smears have been normal.  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 Her mother was diagnosed with adenocarcinoma of the liver, status post surgery/chemotherapy. Her mother is currently age 68 and had recurrence/metastasis to lung 2023. She has had difficulty tolerating chemotherapy. She is living independently with home health care nurses.     D/c OCP     Q 4 wks x 5 days on OCP    Colonoscopy 3/2023, normal  Mammogram 2023        The following portions of the patient's history were reviewed and updated as appropriate: allergies, current medications, past family history, past medical history, past social history, past surgical history, and problem list.    Review of Systems   Constitutional:  Negative for fatigue, fever and unexpected weight change. Respiratory:  Negative for cough, chest tightness, shortness of breath and wheezing. Cardiovascular: Negative. Negative for chest pain and palpitations. Gastrointestinal: Negative. Negative for abdominal distention, abdominal pain, blood in stool, constipation, diarrhea, nausea and vomiting. Genitourinary: Negative. Negative for difficulty urinating, dyspareunia, dysuria, flank pain, frequency, genital sores, hematuria, pelvic pain, urgency, vaginal bleeding, vaginal discharge and vaginal pain. Skin:  Negative for rash. Objective:      /70   Ht 5' 8" (1.727 m)   Wt 101 kg (222 lb 3.2 oz)   LMP 09/26/2023 (Exact Date)   BMI 33.79 kg/m²          Physical Exam  Constitutional:       Appearance: Normal appearance. She is well-developed. HENT:      Head: Normocephalic and atraumatic. Cardiovascular:      Rate and Rhythm: Normal rate and regular rhythm. Pulmonary:      Effort: Pulmonary effort is normal.      Breath sounds: Normal breath sounds. Chest:   Breasts:     Right: No inverted nipple, mass, nipple discharge, skin change or tenderness. Left: No inverted nipple, mass, nipple discharge, skin change or tenderness. Abdominal:      General: Bowel sounds are normal. There is no distension. Palpations: Abdomen is soft. Tenderness: There is no abdominal tenderness. There is no guarding or rebound. Genitourinary:     Labia:         Right: No rash, tenderness or lesion. Left: No rash, tenderness or lesion. Vagina: Normal. No signs of injury. No vaginal discharge or tenderness. Cervix: No cervical motion tenderness, discharge, friability, lesion or cervical bleeding. Uterus: Not enlarged and not tender. Adnexa:         Right: No mass, tenderness or fullness.           Left: No mass, tenderness or fullness. Neurological:      Mental Status: She is alert.    Psychiatric:         Behavior: Behavior normal.

## 2023-10-26 ENCOUNTER — HOSPITAL ENCOUNTER (OUTPATIENT)
Dept: ULTRASOUND IMAGING | Facility: HOSPITAL | Age: 48
Discharge: HOME | End: 2023-10-26
Attending: FAMILY MEDICINE
Payer: COMMERCIAL

## 2023-10-26 DIAGNOSIS — R74.01 ELEVATED AST (SGOT): ICD-10-CM

## 2023-10-26 DIAGNOSIS — Z80.0 FAMILY HISTORY OF LIVER CANCER: ICD-10-CM

## 2023-10-26 PROCEDURE — 76700 US EXAM ABDOM COMPLETE: CPT

## 2023-10-31 LAB
LAB AP GYN PRIMARY INTERPRETATION: NORMAL
Lab: NORMAL

## 2023-11-03 PROBLEM — K76.0 FATTY LIVER: Status: ACTIVE | Noted: 2023-11-03

## 2023-12-06 DIAGNOSIS — E78.00 PURE HYPERCHOLESTEROLEMIA: Primary | ICD-10-CM

## 2023-12-06 RX ORDER — ROSUVASTATIN CALCIUM 5 MG/1
5 TABLET, COATED ORAL DAILY
Refills: 0 | Status: CANCELLED | OUTPATIENT
Start: 2023-12-06

## 2023-12-06 NOTE — TELEPHONE ENCOUNTER
Please review and order accordingly. See pt comments below:  Patient comment: I believe the 5mg replaces the 10 mg i was taking, is that correct?  I iust finished the 10 mg bottle and need to refill

## 2023-12-16 ENCOUNTER — APPOINTMENT (OUTPATIENT)
Dept: LAB | Age: 48
End: 2023-12-16
Payer: COMMERCIAL

## 2023-12-16 DIAGNOSIS — R74.01 ELEVATED AST (SGOT): ICD-10-CM

## 2023-12-16 DIAGNOSIS — Z80.0 FAMILY HISTORY OF LIVER CANCER: ICD-10-CM

## 2023-12-16 LAB
ALBUMIN SERPL BCP-MCNC: 4.3 G/DL (ref 3.5–5)
ALP SERPL-CCNC: 82 U/L (ref 34–104)
ALT SERPL W P-5'-P-CCNC: 29 U/L (ref 7–52)
AST SERPL W P-5'-P-CCNC: 22 U/L (ref 13–39)
BILIRUB DIRECT SERPL-MCNC: 0.07 MG/DL (ref 0–0.2)
BILIRUB SERPL-MCNC: 0.33 MG/DL (ref 0.2–1)
PROT SERPL-MCNC: 7.3 G/DL (ref 6.4–8.4)

## 2023-12-16 PROCEDURE — 36415 COLL VENOUS BLD VENIPUNCTURE: CPT

## 2023-12-16 PROCEDURE — 80076 HEPATIC FUNCTION PANEL: CPT

## 2023-12-20 RX ORDER — ROSUVASTATIN CALCIUM 5 MG/1
5 TABLET, COATED ORAL DAILY
Qty: 30 TABLET | Refills: 2 | Status: SHIPPED | OUTPATIENT
Start: 2023-12-20

## 2023-12-20 NOTE — TELEPHONE ENCOUNTER
Patient is asking if the updated prescription can please be sent into the pharmacy, per her note she is out of medication now.

## 2024-01-12 DIAGNOSIS — E78.00 PURE HYPERCHOLESTEROLEMIA: ICD-10-CM

## 2024-01-12 RX ORDER — ROSUVASTATIN CALCIUM 5 MG/1
5 TABLET, COATED ORAL DAILY
Qty: 90 TABLET | Refills: 1 | Status: SHIPPED | OUTPATIENT
Start: 2024-01-12

## 2024-05-16 ENCOUNTER — HOSPITAL ENCOUNTER (OUTPATIENT)
Dept: RADIOLOGY | Age: 49
Discharge: HOME | End: 2024-05-16
Payer: COMMERCIAL

## 2024-05-16 DIAGNOSIS — Z12.31 VISIT FOR SCREENING MAMMOGRAM: ICD-10-CM

## 2024-05-16 PROCEDURE — 77067 SCR MAMMO BI INCL CAD: CPT

## 2024-05-16 PROCEDURE — 77063 BREAST TOMOSYNTHESIS BI: CPT

## 2024-06-15 DIAGNOSIS — Z00.6 ENCOUNTER FOR EXAMINATION FOR NORMAL COMPARISON OR CONTROL IN CLINICAL RESEARCH PROGRAM: ICD-10-CM

## 2024-06-18 ENCOUNTER — APPOINTMENT (OUTPATIENT)
Dept: LAB | Age: 49
End: 2024-06-18

## 2024-06-18 DIAGNOSIS — Z00.6 ENCOUNTER FOR EXAMINATION FOR NORMAL COMPARISON OR CONTROL IN CLINICAL RESEARCH PROGRAM: ICD-10-CM

## 2024-06-18 PROCEDURE — 36415 COLL VENOUS BLD VENIPUNCTURE: CPT

## 2024-07-09 LAB
APOB+LDLR+PCSK9 GENE MUT ANL BLD/T: NOT DETECTED
BRCA1+BRCA2 DEL+DUP + FULL MUT ANL BLD/T: NOT DETECTED
MLH1+MSH2+MSH6+PMS2 GN DEL+DUP+FUL M: NOT DETECTED

## 2024-07-10 DIAGNOSIS — E78.00 PURE HYPERCHOLESTEROLEMIA: ICD-10-CM

## 2024-07-10 RX ORDER — ROSUVASTATIN CALCIUM 5 MG/1
5 TABLET, COATED ORAL DAILY
Qty: 90 TABLET | Refills: 1 | Status: SHIPPED | OUTPATIENT
Start: 2024-07-10

## 2024-08-29 ENCOUNTER — OFFICE VISIT (OUTPATIENT)
Age: 49
End: 2024-08-29
Payer: COMMERCIAL

## 2024-08-29 VITALS
HEART RATE: 69 BPM | HEIGHT: 68 IN | RESPIRATION RATE: 16 BRPM | TEMPERATURE: 98 F | BODY MASS INDEX: 34.25 KG/M2 | DIASTOLIC BLOOD PRESSURE: 60 MMHG | WEIGHT: 226 LBS | SYSTOLIC BLOOD PRESSURE: 112 MMHG | OXYGEN SATURATION: 96 %

## 2024-08-29 DIAGNOSIS — D64.9 ANEMIA, UNSPECIFIED TYPE: ICD-10-CM

## 2024-08-29 DIAGNOSIS — R23.2 HOT FLASHES: ICD-10-CM

## 2024-08-29 DIAGNOSIS — E55.9 VITAMIN D DEFICIENCY: ICD-10-CM

## 2024-08-29 DIAGNOSIS — R74.01 ELEVATED AST (SGOT): ICD-10-CM

## 2024-08-29 DIAGNOSIS — E53.8 B12 DEFICIENCY: ICD-10-CM

## 2024-08-29 DIAGNOSIS — E78.00 PURE HYPERCHOLESTEROLEMIA: ICD-10-CM

## 2024-08-29 DIAGNOSIS — Z00.00 ANNUAL PHYSICAL EXAM: Primary | ICD-10-CM

## 2024-08-29 PROCEDURE — 99396 PREV VISIT EST AGE 40-64: CPT | Performed by: FAMILY MEDICINE

## 2024-08-29 NOTE — PROGRESS NOTES
Adult Annual Physical  Name: Michelle Fernandez      : 1975      MRN: [id number]  Encounter Provider: Cristy More DO  Encounter Date: 2024   Encounter department: [facility]    Assessment & Plan   1. Annual physical exam  2. BMI 34.0-34.9,adult  -     Comprehensive metabolic panel; Future; Expected date: 2024  -     Lipid Panel with Direct LDL reflex; Future  -     Vitamin B12; Future  -     Vitamin D 25 hydroxy; Future  -     CBC and Platelet; Future  -     Iron Panel (Includes Ferritin, Iron Sat%, Iron, and TIBC); Future  -     TSH, 3rd generation with Free T4 reflex; Future  3. Pure hypercholesterolemia  -     Comprehensive metabolic panel; Future; Expected date: 2024  -     Lipid Panel with Direct LDL reflex; Future  -     TSH, 3rd generation with Free T4 reflex; Future  4. B12 deficiency  -     Vitamin B12; Future  5. Anemia, unspecified type  -     Vitamin B12; Future  -     CBC and Platelet; Future  -     Iron Panel (Includes Ferritin, Iron Sat%, Iron, and TIBC); Future  6. Vitamin D deficiency  -     Vitamin D 25 hydroxy; Future  7. Elevated AST (SGOT)  -     Comprehensive metabolic panel; Future; Expected date: 2024  8. Hot flashes  -     TSH, 3rd generation with Free T4 reflex; Future      Immunizations and preventive care screenings were discussed with patient today. Appropriate education was printed on patient's after visit summary.    Counseling:  Alcohol/drug use: discussed moderation in alcohol intake, the recommendations for healthy alcohol use, and avoidance of illicit drug use.  Dental Health: discussed importance of regular tooth brushing, flossing, and dental visits.  Injury prevention: discussed safety/seat belts, safety helmets, smoke detectors, carbon dioxide detectors, and smoking near bedding or upholstery.  Sexual health: discussed sexually transmitted diseases, partner selection, use of condoms, avoidance of unintended pregnancy, and  contraceptive alternatives.  Exercise: the importance of regular exercise/physical activity was discussed. Recommend exercise 3-5 times per week for at least 30 minutes.       Depression Screening and Follow-up Plan: Patient was screened for depression during today's encounter. They screened negative with a PHQ-2 score of 0.        Return in about 6 months (around 2/28/2025) for Recheck HLD.    The patient indicates understanding of these issues and agrees with the plan.              History of Present Illness     Adult Annual Physical:  Patient presents for annual physical.     Diet and Physical Activity:  - Diet/Nutrition: well balanced diet, limited junk food and consuming 3-5 servings of fruits/vegetables daily.  - Exercise: moderate cardiovascular exercise, strength training exercises and 3-4 times a week on average.    Depression Screening:  - PHQ-2 Score: 0    General Health:  - Sleep: sleeps well and 7-8 hours of sleep on average.  - Hearing: normal hearing bilateral ears.  - Vision: goes for regular eye exams.  - Dental: regular dental visits.    /GYN Health:  - Follows with GYN: yes.   - Menopause: postmenopausal.   - Contraception: menopause.          Review of Systems   Constitutional:  Positive for diaphoresis (NIGHT SWEATS) and fatigue. Negative for appetite change, fever and unexpected weight change.   HENT:  Negative for congestion, dental problem, ear pain, postnasal drip, sore throat and tinnitus.    Eyes:  Negative for pain, discharge and visual disturbance.   Respiratory:  Negative for cough, shortness of breath and wheezing.    Cardiovascular:  Negative for chest pain, palpitations and leg swelling.   Gastrointestinal:  Negative for abdominal pain, constipation, diarrhea, nausea and vomiting.   Endocrine: Positive for heat intolerance (HOT FLASHES). Negative for cold intolerance.   Genitourinary:  Negative for difficulty urinating, dysuria, flank pain and urgency.   Musculoskeletal:  Positive  "for arthralgias. Negative for back pain, joint swelling and myalgias.   Skin:  Negative for rash and wound.   Allergic/Immunologic: Negative for immunocompromised state.   Neurological:  Negative for dizziness, syncope, speech difficulty, weakness and numbness.   Hematological:  Negative for adenopathy. Does not bruise/bleed easily.   Psychiatric/Behavioral:  Negative for confusion, dysphoric mood and sleep disturbance. The patient is not nervous/anxious.      Medical History Reviewed by provider this encounter:  Tobacco  Allergies  Meds  Problems  Med Hx  Surg Hx  Fam Hx         Objective     /60   Pulse 69   Temp 98 °F (36.7 °C)   Resp 16   Ht 5' 8\" (1.727 m)   Wt 103 kg (226 lb)   LMP 09/26/2023 (Exact Date)   SpO2 96%   BMI 34.36 kg/m²     Physical Exam  Vitals and nursing note reviewed.   Constitutional:       General: She is not in acute distress.     Appearance: Normal appearance. She is well-developed.      Comments: Body mass index is 34.36 kg/m².    HENT:      Head: Normocephalic and atraumatic.      Right Ear: Hearing, tympanic membrane, ear canal and external ear normal.      Left Ear: Hearing, tympanic membrane, ear canal and external ear normal.      Nose: Nose normal.      Mouth/Throat:      Mouth: Mucous membranes are moist.      Pharynx: Uvula midline. No oropharyngeal exudate.   Eyes:      General: No scleral icterus.     Conjunctiva/sclera: Conjunctivae normal.      Pupils: Pupils are equal, round, and reactive to light.   Neck:      Thyroid: No thyromegaly.   Cardiovascular:      Rate and Rhythm: Normal rate and regular rhythm.      Heart sounds: Normal heart sounds. No murmur heard.  Pulmonary:      Effort: Pulmonary effort is normal. No respiratory distress.      Breath sounds: Normal breath sounds. No wheezing or rales.   Abdominal:      General: Bowel sounds are normal.      Palpations: Abdomen is soft. There is no mass.      Tenderness: There is no abdominal tenderness. "   Musculoskeletal:         General: No tenderness. Normal range of motion.      Cervical back: Normal range of motion and neck supple.      Right lower leg: No edema.      Left lower leg: No edema.   Lymphadenopathy:      Cervical: No cervical adenopathy.   Skin:     General: Skin is warm and dry.      Coloration: Skin is not jaundiced.      Findings: No erythema or rash.   Neurological:      General: No focal deficit present.      Mental Status: She is alert and oriented to person, place, and time.      Cranial Nerves: No cranial nerve deficit.      Deep Tendon Reflexes: Reflexes are normal and symmetric.   Psychiatric:         Mood and Affect: Mood normal.         Behavior: Behavior normal.

## 2024-08-29 NOTE — PATIENT INSTRUCTIONS
"My[employer] nestor  Macros: 40% carbs, 30% protein, 30% fats  Don't go below 1200 krhis/day      ALEVE 1-2 TABS TWICE DAILY WITH FOOD FOR NO MORE THAN 2 WKS  VOLTAREN GEL AVAILABLE OTC      Patient Education     Routine physical for adults   The Basics   Written by the doctors and editors at [facility]   What is a physical? -- A physical is a routine visit, or \"check-up,\" with your doctor. You might also hear it called a \"wellness visit\" or \"preventive visit.\"  During each visit, the doctor will:   Ask about your physical and mental health   Ask about your habits, behaviors, and lifestyle   Do an exam   Give you vaccines if needed   Talk to you about any medicines you take   Give advice about your health   Answer your questions  Getting regular check-ups is an important part of taking care of your health. It can help your doctor find and treat any problems you have. But it's also important for preventing health problems.  A routine physical is different from a \"sick visit.\" A sick visit is when you see a doctor because of a health concern or problem. Since physicals are scheduled ahead of time, you can think about what you want to ask the doctor.  How often should I get a physical? -- It depends on your age and health. In general, for people age 21 years and older:   If you are younger than 50 years, you might be able to get a physical every 3 years.   If you are 50 years or older, your doctor might recommend a physical every year.  If you have an ongoing health condition, like diabetes or high blood pressure, your doctor will probably want to see you more often.  What happens during a physical? -- In general, each visit will include:   Physical exam - The doctor or nurse will check your height, weight, heart rate, and blood pressure. They will also look at your eyes and ears. They will ask about how you are feeling and whether you have any symptoms that bother you.   Medicines - It's a good idea to bring a list of all " "the medicines you take to each doctor visit. Your doctor will talk to you about your medicines and answer any questions. Tell them if you are having any side effects that bother you. You should also tell them if you are having trouble paying for any of your medicines.   Habits and behaviors - This includes:   Your diet   Your exercise habits   Whether you smoke, drink alcohol, or use drugs   Whether you are sexually active   Whether you feel safe at home  Your doctor will talk to you about things you can do to improve your health and lower your risk of health problems. They will also offer help and support. For example, if you want to quit smoking, they can give you advice and might prescribe medicines. If you want to improve your diet or get more physical activity, they can help you with this, too.   Lab tests, if needed - The tests you get will depend on your age and situation. For example, your doctor might want to check your:   Cholesterol   Blood sugar   Iron level   Vaccines - The recommended vaccines will depend on your age, health, and what vaccines you already had. Vaccines are very important because they can prevent certain serious or deadly infections.   Discussion of screening - \"Screening\" means checking for diseases or other health problems before they cause symptoms. Your doctor can recommend screening based on your age, risk, and preferences. This might include tests to check for:   Cancer, such as breast, prostate, cervical, ovarian, colorectal, prostate, lung, or skin cancer   Sexually transmitted infections, such as chlamydia and gonorrhea   Mental health conditions like depression and anxiety  Your doctor will talk to you about the different types of screening tests. They can help you decide which screenings to have. They can also explain what the results might mean.   Answering questions - The physical is a good time to ask the doctor or nurse questions about your health. If needed, they can " refer you to other doctors or specialists, too.  Adults older than 65 years often need other care, too. As you get older, your doctor will talk to you about:   How to prevent falling at home   Hearing or vision tests   Memory testing   How to take your medicines safely   Making sure that you have the help and support you need at home  All topics are updated as new evidence becomes available and our peer review process is complete.  This topic retrieved from [employer]NEXT on: May 02, 2024.  Topic 701862 Version 1.0  Release: 32.4.3 - C32.122  © 2024 UpToDate, Inc. and/or its affiliates. All rights reserved.  Consumer Information Use and Disclaimer   Disclaimer: This generalized information is a limited summary of diagnosis, treatment, and/or medication information. It is not meant to be comprehensive and should be used as a tool to help the user understand and/or assess potential diagnostic and treatment options. It does NOT include all information about conditions, treatments, medications, side effects, or risks that may apply to a specific patient. It is not intended to be medical advice or a substitute for the medical advice, diagnosis, or treatment of a health care provider based on the health care provider's examination and assessment of a patient's specific and unique circumstances. Patients must speak with a health care provider for complete information about their health, medical questions, and treatment options, including any risks or benefits regarding use of medications. This information does not endorse any treatments or medications as safe, effective, or approved for treating a specific patient. UpToDate, Inc. and its affiliates disclaim any warranty or liability relating to this information or the use thereof.The use of this information is governed by the Terms of Use, available at https://www.woltersbettercodes.orguwer.com/en/know/clinical-effectiveness-terms. 2024© UpToDate, Inc. and its affiliates and/or licensors.  All rights reserved.  Copyright   © 2024 [employer] and/or its affiliates. All rights reserved.

## 2024-09-12 ENCOUNTER — LAB (OUTPATIENT)
Dept: LAB | Facility: CLINIC | Age: 49
End: 2024-09-12
Payer: COMMERCIAL

## 2024-09-12 DIAGNOSIS — E53.8 B12 DEFICIENCY: ICD-10-CM

## 2024-09-12 DIAGNOSIS — E55.9 VITAMIN D DEFICIENCY: ICD-10-CM

## 2024-09-12 DIAGNOSIS — E78.00 PURE HYPERCHOLESTEROLEMIA: ICD-10-CM

## 2024-09-12 DIAGNOSIS — R23.2 HOT FLASHES: ICD-10-CM

## 2024-09-12 DIAGNOSIS — R74.01 ELEVATED AST (SGOT): ICD-10-CM

## 2024-09-12 DIAGNOSIS — D64.9 ANEMIA, UNSPECIFIED TYPE: ICD-10-CM

## 2024-09-12 LAB
25(OH)D3 SERPL-MCNC: 40.2 NG/ML (ref 30–100)
ALBUMIN SERPL BCG-MCNC: 4.4 G/DL (ref 3.5–5)
ALP SERPL-CCNC: 96 U/L (ref 34–104)
ALT SERPL W P-5'-P-CCNC: 24 U/L (ref 7–52)
ANION GAP SERPL CALCULATED.3IONS-SCNC: 9 MMOL/L (ref 4–13)
AST SERPL W P-5'-P-CCNC: 22 U/L (ref 13–39)
BILIRUB SERPL-MCNC: 0.42 MG/DL (ref 0.2–1)
BUN SERPL-MCNC: 17 MG/DL (ref 5–25)
CALCIUM SERPL-MCNC: 9.5 MG/DL (ref 8.4–10.2)
CHLORIDE SERPL-SCNC: 104 MMOL/L (ref 96–108)
CHOLEST SERPL-MCNC: 185 MG/DL
CO2 SERPL-SCNC: 27 MMOL/L (ref 21–32)
CREAT SERPL-MCNC: 0.86 MG/DL (ref 0.6–1.3)
ERYTHROCYTE [DISTWIDTH] IN BLOOD BY AUTOMATED COUNT: 13.1 % (ref 11.6–15.1)
FERRITIN SERPL-MCNC: 74 NG/ML (ref 11–307)
GFR SERPL CREATININE-BSD FRML MDRD: 78 ML/MIN/1.73SQ M
GLUCOSE P FAST SERPL-MCNC: 93 MG/DL (ref 65–99)
HCT VFR BLD AUTO: 37.1 % (ref 34.8–46.1)
HDLC SERPL-MCNC: 59 MG/DL
HGB BLD-MCNC: 11.9 G/DL (ref 11.5–15.4)
IRON SATN MFR SERPL: 19 % (ref 15–50)
IRON SERPL-MCNC: 64 UG/DL (ref 50–212)
LDLC SERPL CALC-MCNC: 109 MG/DL (ref 0–100)
MCH RBC QN AUTO: 28.8 PG (ref 26.8–34.3)
MCHC RBC AUTO-ENTMCNC: 32.1 G/DL (ref 31.4–37.4)
MCV RBC AUTO: 90 FL (ref 82–98)
PLATELET # BLD AUTO: 203 THOUSANDS/UL (ref 149–390)
PMV BLD AUTO: 12.5 FL (ref 8.9–12.7)
POTASSIUM SERPL-SCNC: 4.1 MMOL/L (ref 3.5–5.3)
PROT SERPL-MCNC: 7.3 G/DL (ref 6.4–8.4)
RBC # BLD AUTO: 4.13 MILLION/UL (ref 3.81–5.12)
SODIUM SERPL-SCNC: 140 MMOL/L (ref 135–147)
TIBC SERPL-MCNC: 336 UG/DL (ref 250–450)
TRIGL SERPL-MCNC: 86 MG/DL
TSH SERPL DL<=0.05 MIU/L-ACNC: 2.66 UIU/ML (ref 0.45–4.5)
UIBC SERPL-MCNC: 272 UG/DL (ref 155–355)
VIT B12 SERPL-MCNC: 302 PG/ML (ref 180–914)
WBC # BLD AUTO: 5.63 THOUSAND/UL (ref 4.31–10.16)

## 2024-09-12 PROCEDURE — 82306 VITAMIN D 25 HYDROXY: CPT

## 2024-09-12 PROCEDURE — 85027 COMPLETE CBC AUTOMATED: CPT

## 2024-09-12 PROCEDURE — 80053 COMPREHEN METABOLIC PANEL: CPT

## 2024-09-12 PROCEDURE — 83540 ASSAY OF IRON: CPT

## 2024-09-12 PROCEDURE — 82607 VITAMIN B-12: CPT

## 2024-09-12 PROCEDURE — 80061 LIPID PANEL: CPT

## 2024-09-12 PROCEDURE — 84443 ASSAY THYROID STIM HORMONE: CPT

## 2024-09-12 PROCEDURE — 82728 ASSAY OF FERRITIN: CPT

## 2024-09-12 PROCEDURE — 36415 COLL VENOUS BLD VENIPUNCTURE: CPT

## 2024-09-12 PROCEDURE — 83550 IRON BINDING TEST: CPT

## 2024-09-13 NOTE — RESULT ENCOUNTER NOTE
Please inform pt that labs are stable.  Continue current medications and continue to engage in healthy lifestyle (diet, exercise).    Thanks!  Cristy More, DO

## 2024-10-21 ENCOUNTER — OFFICE VISIT (OUTPATIENT)
Dept: OBGYN CLINIC | Facility: CLINIC | Age: 49
End: 2024-10-21
Payer: COMMERCIAL

## 2024-10-21 VITALS
DIASTOLIC BLOOD PRESSURE: 72 MMHG | HEIGHT: 68 IN | BODY MASS INDEX: 34.59 KG/M2 | WEIGHT: 228.2 LBS | SYSTOLIC BLOOD PRESSURE: 118 MMHG

## 2024-10-21 DIAGNOSIS — Z01.419 ENCOUNTER FOR ANNUAL ROUTINE GYNECOLOGICAL EXAMINATION: Primary | ICD-10-CM

## 2024-10-21 DIAGNOSIS — Z12.31 ENCOUNTER FOR SCREENING MAMMOGRAM FOR MALIGNANT NEOPLASM OF BREAST: ICD-10-CM

## 2024-10-21 PROCEDURE — S0612 ANNUAL GYNECOLOGICAL EXAMINA: HCPCS | Performed by: OBSTETRICS & GYNECOLOGY

## 2024-10-21 NOTE — PROGRESS NOTES
Ambulatory Visit  Name: Michelle Fernandez      : 1975      MRN: [id number]  Encounter Provider: Day Lisa DO  Encounter Date: 10/21/2024   Encounter department: [facility]    Assessment & Plan  Encounter for annual routine gynecological examination         Encounter for screening mammogram for malignant neoplasm of breast         Pap deferred due to low risk status.  Discussed over-the-counter agents for vasomotor symptoms.  All questions answered.  Encouraged self breast examination as well as self calcium supplementation.  Continue annual mammogram.  Reviewed colon cancer screening, up to date.  She will continue to follow-up with primary care as scheduled.  Return to office in 1 year or as needed      History of Present Illness     Michelle Fernandez is a 49 y.o. female who presents     This is a pleasant 49-year-old female P1 ( x 1, age 13) presents for her GYN exam.  She went through menopause at age 48.  She has never been on hormone replacement therapy.  She denies any vaginal bleeding or spotting.  No changes in bowel or bladder function.  She has been using an nestor over-the-counter agent for temperature changes with some improvement.  She does follow-up with her family physician on a regular basis.    Pap 10/2023    Mammo 2024, scheudled     Colon 3/2023    Genetic screen normal 2024    History obtained from : patient  Review of Systems   Constitutional:  Negative for fatigue, fever and unexpected weight change.   Respiratory:  Negative for cough, chest tightness, shortness of breath and wheezing.    Cardiovascular: Negative.  Negative for chest pain and palpitations.   Gastrointestinal: Negative.  Negative for abdominal distention, abdominal pain, blood in stool, constipation, diarrhea, nausea and vomiting.   Genitourinary: Negative.  Negative for difficulty urinating, dyspareunia, dysuria, flank pain, frequency, genital sores, hematuria, pelvic pain, urgency,  "vaginal bleeding, vaginal discharge and vaginal pain.   Skin:  Negative for rash.     Current Outpatient Medications on File Prior to Visit   Medication Sig Dispense Refill    NON FORMULARY HAPPY KIMBERLY takes 3 capsules once a day      rosuvastatin (CRESTOR) 5 mg tablet TAKE 1 TABLET (5 MG TOTAL) BY MOUTH DAILY. 90 tablet 1     No current facility-administered medications on file prior to visit.          Objective     /72   Ht 5' 8\" (1.727 m)   Wt 104 kg (228 lb 3.2 oz)   LMP 09/26/2023 (Exact Date)   BMI 34.70 kg/m²     Physical Exam  Constitutional:       Appearance: Normal appearance. She is well-developed.   HENT:      Head: Normocephalic and atraumatic.   Cardiovascular:      Rate and Rhythm: Normal rate and regular rhythm.   Pulmonary:      Effort: Pulmonary effort is normal.      Breath sounds: Normal breath sounds.   Chest:   Breasts:     Right: No inverted nipple, mass, nipple discharge, skin change or tenderness.      Left: No inverted nipple, mass, nipple discharge, skin change or tenderness.   Abdominal:      General: Bowel sounds are normal. There is no distension.      Palpations: Abdomen is soft.      Tenderness: There is no abdominal tenderness. There is no guarding or rebound.   Genitourinary:     Labia:         Right: No rash, tenderness or lesion.         Left: No rash, tenderness or lesion.       Vagina: Normal. No signs of injury. No vaginal discharge or tenderness.      Cervix: No cervical motion tenderness, discharge, friability, lesion or cervical bleeding.      Uterus: Not enlarged and not tender.       Adnexa:         Right: No mass, tenderness or fullness.          Left: No mass, tenderness or fullness.     Neurological:      Mental Status: She is alert.   Psychiatric:         Behavior: Behavior normal.       Administrative Statements   I have spent a total time of 30 minutes in caring for this patient on the day of the visit/encounter including Impressions.  "

## 2025-01-03 DIAGNOSIS — E78.00 PURE HYPERCHOLESTEROLEMIA: ICD-10-CM

## 2025-01-03 RX ORDER — ROSUVASTATIN CALCIUM 5 MG/1
5 TABLET, COATED ORAL DAILY
Qty: 90 TABLET | Refills: 1 | Status: SHIPPED | OUTPATIENT
Start: 2025-01-03

## 2025-02-04 DIAGNOSIS — B00.9 RECURRENT HSV (HERPES SIMPLEX VIRUS): Primary | ICD-10-CM

## 2025-02-04 RX ORDER — VALACYCLOVIR HYDROCHLORIDE 1 G/1
1000 TABLET, FILM COATED ORAL 2 TIMES DAILY
Qty: 2 TABLET | Refills: 0 | Status: SHIPPED | OUTPATIENT
Start: 2025-02-04 | End: 2025-02-04 | Stop reason: CLARIF

## 2025-02-04 RX ORDER — VALACYCLOVIR HYDROCHLORIDE 1 G/1
1000 TABLET, FILM COATED ORAL 2 TIMES DAILY
Qty: 30 TABLET | Refills: 0 | Status: SHIPPED | OUTPATIENT
Start: 2025-02-04 | End: 2025-02-05

## 2025-02-13 ENCOUNTER — TELEMEDICINE (OUTPATIENT)
Dept: OTHER | Facility: HOSPITAL | Age: 50
End: 2025-02-13
Payer: COMMERCIAL

## 2025-02-13 DIAGNOSIS — J20.9 ACUTE BRONCHITIS, UNSPECIFIED ORGANISM: Primary | ICD-10-CM

## 2025-02-13 DIAGNOSIS — R42 VERTIGO: ICD-10-CM

## 2025-02-13 PROCEDURE — 99213 OFFICE O/P EST LOW 20 MIN: CPT | Performed by: PHYSICIAN ASSISTANT

## 2025-02-13 RX ORDER — MECLIZINE HYDROCHLORIDE 25 MG/1
25 TABLET ORAL 3 TIMES DAILY PRN
Qty: 30 TABLET | Refills: 0 | Status: SHIPPED | OUTPATIENT
Start: 2025-02-13

## 2025-02-13 RX ORDER — ALBUTEROL SULFATE 90 UG/1
1 INHALANT RESPIRATORY (INHALATION) EVERY 4 HOURS PRN
Qty: 6.7 G | Refills: 0 | Status: SHIPPED | OUTPATIENT
Start: 2025-02-13

## 2025-02-13 RX ORDER — GUAIFENESIN 600 MG/1
1200 TABLET, EXTENDED RELEASE ORAL EVERY 12 HOURS SCHEDULED
Qty: 120 TABLET | Refills: 0 | Status: SHIPPED | OUTPATIENT
Start: 2025-02-13

## 2025-02-13 RX ORDER — METHYLPREDNISOLONE 4 MG/1
TABLET ORAL
Qty: 21 TABLET | Refills: 0 | Status: SHIPPED | OUTPATIENT
Start: 2025-02-13

## 2025-02-13 RX ORDER — BENZONATATE 200 MG/1
200 CAPSULE ORAL 3 TIMES DAILY PRN
Qty: 20 CAPSULE | Refills: 0 | Status: SHIPPED | OUTPATIENT
Start: 2025-02-13

## 2025-02-13 NOTE — PATIENT INSTRUCTIONS
"Thank you for choosing to trust [facility] with your care today. Virtual visits are very convenient, but do have some limitations. Schedule a follow-up appointment with your primary care physician for recheck in person in 2-3 days-especially if symptoms aren't improving. If you cannot see your PCP, you can schedule a follow up appointment at a [facility] Now. Go to the emergency department if you develop any new or worsening symptoms including fever, chest pain, shortness of breath, or anything else that is concerning.    Excuses can be found in \"Letters\" section of [employer]. Can print if opened from a web browser  [facility] phone number is [phone] if you need assistance or have further questions    [phone]  Schedule or Reschedule Outpatient Testing - Option 2  Billing - Option 3  General Info - Option 4  [employer] Help - Option 5  Comprehensive Spine Program - Option 6    "

## 2025-02-13 NOTE — PROGRESS NOTES
Virtual Regular Visit  Name: Michelle Fernandez      : 1975      MRN: [id number]  Encounter Provider: Shannon D Severino, PA-C  Encounter Date: 2025   Encounter department: VIRTUAL CARE       Verification of patient location:  Patient is located at Home in the following state in which I hold an active license [place] :  Assessment & Plan  Acute bronchitis, unspecified organism    Orders:    benzonatate (TESSALON) 200 MG capsule; Take 1 capsule (200 mg total) by mouth 3 (three) times a day as needed for cough    albuterol (Proventil HFA) 90 mcg/act inhaler; Inhale 1 puff every 4 (four) hours as needed for wheezing    methylPREDNISolone 4 MG tablet therapy pack; Use as directed on package    guaiFENesin (MUCINEX) 600 mg 12 hr tablet; Take 2 tablets (1,200 mg total) by mouth every 12 (twelve) hours        Encounter provider Shannon D Severino, PA-C    The patient was identified by name and date of birth. Michelle Fernandez was informed that this is a telemedicine visit and that the visit is being conducted through the Epic Embedded platform. She agrees to proceed..  My office door was closed. No one else was in the room.  She acknowledged consent and understanding of privacy and security of the video platform. The patient has agreed to participate and understands they can discontinue the visit at any time.    Patient is aware this is a billable service.     History obtained from: patient and  around  History of Present Illness     Pt reports URi sx- heaviness/tightness in chest, wheezing, coughing, congestion, some ear pressure, fatigue starting 4 days ago. Also has some vertigo. Tried tylenol for HA. No fever, NVD.      Review of Systems   Constitutional:  Negative for fever.   HENT:  Negative for nosebleeds.    Eyes:  Negative for redness.   Respiratory:  Positive for cough, chest tightness, shortness of breath and wheezing.    Cardiovascular:  Negative for chest pain.   Gastrointestinal:  Negative for  blood in stool.   Genitourinary:  Negative for hematuria.   Musculoskeletal:  Negative for gait problem.   Skin:  Negative for rash.   Neurological:  Negative for seizures.   Psychiatric/Behavioral:  Negative for behavioral problems.        Objective   LMP 09/26/2023 (Exact Date)     Physical Exam  Constitutional:       General: She is not in acute distress.     Appearance: Normal appearance. She is ill-appearing (mild). She is not toxic-appearing.   HENT:      Head: Normocephalic and atraumatic.      Nose: No rhinorrhea.      Mouth/Throat:      Mouth: Mucous membranes are moist.   Eyes:      Conjunctiva/sclera: Conjunctivae normal.   Pulmonary:      Effort: Pulmonary effort is normal. No respiratory distress.      Breath sounds: No wheezing (no gross audible wheeze through computer).   Musculoskeletal:      Cervical back: Normal range of motion.   Skin:     Findings: No rash (on face or neck).   Neurological:      Mental Status: She is alert.      Cranial Nerves: No dysarthria or facial asymmetry.   Psychiatric:         Mood and Affect: Mood normal.         Behavior: Behavior normal.         Visit Time  Total Visit Duration: 8 minutes not including the time spent for establishing the audio/video connection.

## 2025-05-19 ENCOUNTER — HOSPITAL ENCOUNTER (OUTPATIENT)
Dept: RADIOLOGY | Age: 50
Discharge: HOME | End: 2025-05-19
Payer: COMMERCIAL

## 2025-05-19 VITALS — BODY MASS INDEX: 34.1 KG/M2 | HEIGHT: 68 IN | WEIGHT: 225 LBS

## 2025-05-19 DIAGNOSIS — Z12.31 VISIT FOR SCREENING MAMMOGRAM: ICD-10-CM

## 2025-05-19 PROCEDURE — 77067 SCR MAMMO BI INCL CAD: CPT

## 2025-05-19 PROCEDURE — 77063 BREAST TOMOSYNTHESIS BI: CPT

## 2025-06-02 ENCOUNTER — PATIENT MESSAGE (OUTPATIENT)
Age: 50
End: 2025-06-02

## 2025-06-17 ENCOUNTER — TELEPHONE (OUTPATIENT)
Age: 50
End: 2025-06-17

## 2025-06-17 NOTE — TELEPHONE ENCOUNTER
Left message for patient to return office call in regards to scheduling her Physical  After 8/30/2025 with PCP

## 2025-07-02 ENCOUNTER — HOSPITAL ENCOUNTER (EMERGENCY)
Facility: HOSPITAL | Age: 50
Discharge: HOME | End: 2025-07-02
Attending: EMERGENCY MEDICINE
Payer: COMMERCIAL

## 2025-07-02 ENCOUNTER — APPOINTMENT (EMERGENCY)
Dept: CT IMAGING | Facility: HOSPITAL | Age: 50
End: 2025-07-02
Payer: COMMERCIAL

## 2025-07-02 ENCOUNTER — OFFICE VISIT (OUTPATIENT)
Dept: URGENT CARE | Age: 50
End: 2025-07-02
Payer: COMMERCIAL

## 2025-07-02 VITALS
RESPIRATION RATE: 18 BRPM | HEART RATE: 63 BPM | TEMPERATURE: 98.6 F | DIASTOLIC BLOOD PRESSURE: 86 MMHG | SYSTOLIC BLOOD PRESSURE: 127 MMHG | OXYGEN SATURATION: 97 %

## 2025-07-02 VITALS
RESPIRATION RATE: 18 BRPM | SYSTOLIC BLOOD PRESSURE: 146 MMHG | TEMPERATURE: 98.5 F | HEART RATE: 67 BPM | DIASTOLIC BLOOD PRESSURE: 90 MMHG | OXYGEN SATURATION: 95 %

## 2025-07-02 DIAGNOSIS — T14.8XXA BRUISING: ICD-10-CM

## 2025-07-02 DIAGNOSIS — T14.8XXA HEMATOMA: ICD-10-CM

## 2025-07-02 DIAGNOSIS — S09.90XA INJURY OF HEAD, INITIAL ENCOUNTER: Primary | ICD-10-CM

## 2025-07-02 DIAGNOSIS — S06.0XAA CONCUSSION: ICD-10-CM

## 2025-07-02 DIAGNOSIS — R03.0 ELEVATED BLOOD PRESSURE READING: ICD-10-CM

## 2025-07-02 DIAGNOSIS — S09.90XA CLOSED HEAD INJURY, INITIAL ENCOUNTER: Primary | ICD-10-CM

## 2025-07-02 DIAGNOSIS — R51.9 ACUTE HEADACHE: ICD-10-CM

## 2025-07-02 DIAGNOSIS — S00.03XA CONTUSION OF SCALP, INITIAL ENCOUNTER: ICD-10-CM

## 2025-07-02 PROCEDURE — 99283 EMERGENCY DEPT VISIT LOW MDM: CPT

## 2025-07-02 PROCEDURE — 96365 THER/PROPH/DIAG IV INF INIT: CPT

## 2025-07-02 PROCEDURE — S9083 URGENT CARE CENTER GLOBAL: HCPCS

## 2025-07-02 PROCEDURE — 72125 CT NECK SPINE W/O DYE: CPT

## 2025-07-02 PROCEDURE — 70450 CT HEAD/BRAIN W/O DYE: CPT

## 2025-07-02 PROCEDURE — 96375 TX/PRO/DX INJ NEW DRUG ADDON: CPT

## 2025-07-02 PROCEDURE — 70486 CT MAXILLOFACIAL W/O DYE: CPT

## 2025-07-02 PROCEDURE — 99284 EMERGENCY DEPT VISIT MOD MDM: CPT | Performed by: EMERGENCY MEDICINE

## 2025-07-02 PROCEDURE — G0382 LEV 3 HOSP TYPE B ED VISIT: HCPCS

## 2025-07-02 RX ORDER — KETOROLAC TROMETHAMINE 30 MG/ML
15 INJECTION, SOLUTION INTRAMUSCULAR; INTRAVENOUS ONCE
Status: COMPLETED | OUTPATIENT
Start: 2025-07-02 | End: 2025-07-02

## 2025-07-02 RX ORDER — ACETAMINOPHEN 10 MG/ML
1000 INJECTION, SOLUTION INTRAVENOUS ONCE
Status: COMPLETED | OUTPATIENT
Start: 2025-07-02 | End: 2025-07-02

## 2025-07-02 RX ORDER — ACETAMINOPHEN 325 MG/1
650 TABLET ORAL EVERY 6 HOURS PRN
COMMUNITY

## 2025-07-02 RX ORDER — METOCLOPRAMIDE HYDROCHLORIDE 5 MG/ML
10 INJECTION INTRAMUSCULAR; INTRAVENOUS ONCE
Status: COMPLETED | OUTPATIENT
Start: 2025-07-02 | End: 2025-07-02

## 2025-07-02 RX ADMIN — METOCLOPRAMIDE 10 MG: 5 INJECTION, SOLUTION INTRAMUSCULAR; INTRAVENOUS at 21:36

## 2025-07-02 RX ADMIN — ACETAMINOPHEN 1000 MG: 10 INJECTION INTRAVENOUS at 21:38

## 2025-07-02 RX ADMIN — KETOROLAC TROMETHAMINE 15 MG: 30 INJECTION, SOLUTION INTRAMUSCULAR; INTRAVENOUS at 23:24

## 2025-07-02 NOTE — PATIENT INSTRUCTIONS
Please follow up with concussion clinic as directed.   At this point, strongly recommend further evaluation in the emergency department for periorbital and scalp swelling after blunt force trauma to head.   Report to [facility] emergency department for further evaluation.

## 2025-07-02 NOTE — PROGRESS NOTES
[facility]  Name: Michelle Fernandez      : 1975      MRN: [id number]  Encounter Provider: JORDANA Wood  Encounter Date: 2025   Encounter [facility]  :  Please follow up with concussion clinic as directed.   At this point, strongly recommend further evaluation in the emergency department for periorbital and scalp swelling after blunt force trauma to head.   Report to [facility] emergency department for further evaluation.   Assessment & Plan  Injury of head, initial encounter    Orders:    Transfer to other facility    Ambulatory Referral to Comprehensive Concussion Program; Future    Contusion of scalp, initial encounter             Patient Instructions  Patient Education     Concussion, Adult ED   General Information   You came to the Emergency Department (ED) for a head injury and were diagnosed with a concussion. This is a mild brain or head injury. Many people recover quickly after a concussion. But, sometimes, symptoms can last for several days or longer.  The doctor feels that it is safe for you to go home.  What care is needed at home?   If the doctors told you to have someone stay with you, it is important that they understand what to watch for. They also need to know when to get emergency help.  Call your regular doctor to let them know you were in the ED. Make a follow-up appointment if you were told to.  Rest your body. Get plenty of sleep. Alternate rest with light activity like walking. Avoid heavy exercise if it makes you feel worse.  Rest your brain. For the first day, stay away from doing things that need a lot of thought or focus. Stay away from TV, computers, phone screens, and video games. After the first day, slowly introduce these activities. Stop them if they make you feel worse.  If your head hurts, you may want to take medicine like ibuprofen, naproxen, or acetaminophen.  When do I need to get emergency help?   Call  for an ambulance right away if:   You have trouble waking up from sleep and remain groggy or confused once awake.  While you are awake, you become confused or have trouble thinking clearly.  You have trouble speaking or seeing.  You have trouble walking or cannot move a part of your body like an arm or leg.  You have a seizure.  You develop severe or worsening headaches.  You start throwing up.  When do I need to call the doctor?   You still have symptoms that interfere with your normal activities 1 week after your injury.  You feel generally weaker or more tired than usual.  You have new or worsening symptoms.  Last Reviewed Date   2020-08-04  Consumer Information Use and Disclaimer   This generalized information is a limited summary of diagnosis, treatment, and/or medication information. It is not meant to be comprehensive and should be used as a tool to help the user understand and/or assess potential diagnostic and treatment options. It does NOT include all information about conditions, treatments, medications, side effects, or risks that may apply to a specific patient. It is not intended to be medical advice or a substitute for the medical advice, diagnosis, or treatment of a health care provider based on the health care provider's examination and assessment of a patient’s specific and unique circumstances. Patients must speak with a health care provider for complete information about their health, medical questions, and treatment options, including any risks or benefits regarding use of medications. This information does not endorse any treatments or medications as safe, effective, or approved for treating a specific patient. [employer] and its affiliates disclaim any warranty or liability relating to this information or the use thereof. The use of this information is governed by the Terms of Use, available at https://www.[employer].com/en/know/clinical-effectiveness-terms   Copyright   Follow up with  PCP in 3-5 days.  Proceed to  ER if symptoms worsen.    If tests are performed, our office will contact you with results only if changes need to made to the care plan discussed with you at the visit. You can review your full results on [facility].    Chief Complaint:   Chief Complaint   Patient presents with    Head Injury     Struck in right side of head on Sunday by a soft ball... no loss of consciousness. Having severe burning headache and ocular swelling.      History of Present Illness   Patient is a 50 year old female with no significant PMH, who presents with  for evaluation of scalp hematoma after being struck in the forehead with a baseball on Sunday. She reports that she was evaluated by an  on site, but did not seek further evaluation. She reports a burning pain and headache in the area of impact which has not improved with pain medication. She also notes swelling and ecchymosis around eyes. She denies nausea/vomiting, dizziness, vision changes, LOC, blood thinner use, neck pain.           Review of Systems   Constitutional:  Negative for fatigue and fever.   HENT:  Positive for facial swelling. Negative for congestion, ear discharge, ear pain, postnasal drip, rhinorrhea, sinus pressure, sinus pain, sneezing and sore throat.    Eyes: Negative.  Negative for pain, discharge, redness and itching.   Respiratory: Negative.  Negative for apnea, cough, choking, chest tightness, shortness of breath, wheezing and stridor.    Cardiovascular: Negative.  Negative for chest pain and palpitations.   Gastrointestinal: Negative.  Negative for diarrhea, nausea and vomiting.   Endocrine: Negative.  Negative for polydipsia, polyphagia and polyuria.   Genitourinary: Negative.  Negative for decreased urine volume and flank pain.   Musculoskeletal: Negative.  Negative for arthralgias, back pain, myalgias, neck pain and neck stiffness.   Skin: Negative.  Negative for color change and rash.    Allergic/Immunologic: Negative.  Negative for environmental allergies.   Neurological:  Positive for headaches. Negative for dizziness, facial asymmetry, light-headedness and numbness.   Hematological: Negative.  Negative for adenopathy.   Psychiatric/Behavioral: Negative.       Past Medical History   Past Medical History[1]  Past Surgical History[2]  Family History[3]  she reports that she has never smoked. She has never used smokeless tobacco. She reports current alcohol use of about 2.0 standard drinks of alcohol per week. She reports that she does not use drugs.  Current Outpatient Medications   Medication Instructions    acetaminophen (TYLENOL) 650 mg, Every 6 hours PRN    albuterol (Proventil HFA) 90 mcg/act inhaler 1 puff, Inhalation, Every 4 hours PRN    benzonatate (TESSALON) 200 mg, Oral, 3 times daily PRN    guaiFENesin (MUCINEX) 1,200 mg, Oral, Every 12 hours scheduled    meclizine (ANTIVERT) 25 mg, Oral, 3 times daily PRN    methylPREDNISolone 4 MG tablet therapy pack Use as directed on package    rosuvastatin (CRESTOR) 5 mg, Oral, Daily    valACYclovir (VALTREX) 1,000 mg, Oral, 2 times daily   Allergies[4]     Objective   /90   Pulse 67   Temp 98.5 °F (36.9 °C)   Resp 18   LMP 09/26/2023 (Exact Date)   SpO2 95%      Physical Exam  Vitals and nursing note reviewed.   Constitutional:       General: She is not in acute distress.     Appearance: She is well-developed. She is not ill-appearing, toxic-appearing or diaphoretic.      Interventions: She is not intubated.  HENT:      Head: Normocephalic. Raccoon eyes and contusion present. No Batista's sign, abrasion, right periorbital erythema, left periorbital erythema or laceration.        Comments: (+) Large hematoma of right upper forehead, bilateral periorbital swelling and ecchymosis.      Right Ear: Tympanic membrane, ear canal and external ear normal. There is no impacted cerumen.      Left Ear: Tympanic membrane, ear canal and external ear  normal. There is no impacted cerumen.     Eyes:      Conjunctiva/sclera: Conjunctivae normal.     Neck:      Thyroid: No thyroid mass, thyromegaly or thyroid tenderness.     Cardiovascular:      Rate and Rhythm: Normal rate and regular rhythm.      Pulses: Normal pulses.      Heart sounds: Normal heart sounds, S1 normal and S2 normal. Heart sounds not distant. No murmur heard.     No friction rub. No gallop.   Pulmonary:      Effort: Pulmonary effort is normal. No tachypnea, bradypnea, accessory muscle usage, prolonged expiration, respiratory distress or retractions. She is not intubated.      Breath sounds: Normal breath sounds. No stridor, decreased air movement or transmitted upper airway sounds. No decreased breath sounds, wheezing, rhonchi or rales.   Abdominal:      Palpations: Abdomen is soft.      Tenderness: There is no abdominal tenderness.     Musculoskeletal:         General: No swelling.      Cervical back: Full passive range of motion without pain, normal range of motion and neck supple. No spinous process tenderness or muscular tenderness. Normal range of motion.   Lymphadenopathy:      Cervical: No cervical adenopathy.      Right cervical: No superficial cervical adenopathy.     Left cervical: No superficial cervical adenopathy.     Skin:     General: Skin is warm and dry.      Capillary Refill: Capillary refill takes less than 2 seconds.     Neurological:      Mental Status: She is alert and oriented to person, place, and time. Mental status is at baseline.      GCS: GCS eye subscore is 4. GCS verbal subscore is 5. GCS motor subscore is 6.      Cranial Nerves: Cranial nerves 2-12 are intact.      Sensory: Sensation is intact.      Motor: Motor function is intact.      Coordination: Coordination is intact.      Gait: Gait is intact.      Comments: PERRLA, 3 mm bilaterally, no nystagmus with EOM.    Psychiatric:         Mood and Affect: Mood normal.         Portions of the record may have been created  "with voice recognition software.  Occasional wrong word or \"sound a like\" substitutions may have occurred due to the inherent limitations of voice recognition software.  Read the chart carefully and recognize, using context, where substitutions have occurred.         [1]   Past Medical History:  Diagnosis Date    Anemia     Dry eye syndrome of both eyes     Fibroids     2 cm calcified fibroid    HELLP syndrome     she does not have HTN     History of hemolysis, elevated liver enzymes, and low platelet (HELLP) syndrome 2009    History of infertility     IVF/ICSI    Irregular menses     Pulmonic regurgitation 2023    Tricuspid regurgitation 2023   [2]   Past Surgical History:  Procedure Laterality Date     SECTION      COLONOSCOPY      in [place] - normal.     COLONOSCOPY      ENDOMETRIAL BIOPSY  2014    proliferative endometrium    KNEE ARTHROSCOPY Right    [3]   Family History  Problem Relation Name Age of Onset    Cancer Mother Allie Minor 72        liver/bile duct    Diabetes Mother Allie Mily     Hyperlipidemia Mother Allie Mily     Osteopenia Mother Allie Mily     Liver cancer Mother Allie Minor         dx  in fall, was in bile duct (rare)    Atrial fibrillation Mother Allie Mily     Cataracts Mother Allie Mily     Liver disease Mother Allie Mily     Lung cancer Mother Allie Mily     Diabetes Father Ray Mily     Hypertension Father Ray Mily     Hyperlipidemia Father Ray Mily     Myasthenia gravis Father Ray Mily     No Known Problems Daughter      No Known Problems Maternal Grandmother      No Known Problems Maternal Grandfather      Breast cancer Paternal Grandmother Mckenzie Mily 62    No Known Problems Paternal Grandfather      Colon polyps Brother      No Known Problems Maternal Aunt      No Known Problems Paternal Aunt      No Known Problems Paternal Aunt      No Known Problems Paternal Aunt     [4] No Known Allergies    "

## 2025-07-03 NOTE — ED PROVIDER NOTES
Time reflects when diagnosis was documented in both MDM as applicable and the Disposition within this note       Time User Action Codes Description Comment    7/2/2025  9:23 PM Jakob Leon Add [S09.90XA] Closed head injury, initial encounter     7/2/2025  9:23 PM Jakob Leon Add [R03.0] Elevated blood pressure reading     7/2/2025  9:23 PM Jakob Leon Add [T14.8XXA] Bruising     7/2/2025  9:23 PM Jakob Leon Add [S06.0XAA] Concussion     7/2/2025  9:23 PM Jakob Leon Add [R51.9] Acute headache     7/2/2025 11:16 PM Jakob Leon Add [T14.8XXA] Hematoma           ED Disposition       ED Disposition   Discharge    Condition   Stable    Date/Time   Wed Jul 2, 2025 11:17 PM    Comment   Michelle Fernandez discharge to home/self care.                   Assessment & Plan       Medical Decision Making  Patient is a 50-year-old female, with a history significant for postmenopausal status per my review of the medical record, who presents to the ED today for evaluation of a 3-day history of sudden onset and traumatic headache that occurred when patient was struck in the head by a softball at a practice.  Patient denies loss of consciousness/vomiting.  There is associated facial swelling/bruising.  Light exacerbates her discomfort.  Patient has been taking Tylenol, up to 3 g daily, to remit her discomfort.  Patient only took 1 g so far today.  Patient's , present in the room providing collateral history, states patient is not confused.  Patient denies chest pain, dyspnea, abdominal pain, injury elsewhere.  Patient is currently afebrile and hemodynamically stable.  Physical exam is notable for facial swelling and raccoons eyes and tenderness palpation of the bony orbit.  Clear heart and lungs, soft nontender abdomen, mentating well.  This presentation is concerning for: Concussion, primary headache, fracture, bruising.  Low suspicion for ICH based upon  history/physical exam.  Will investigate with CT head, facial bones, C-spine.  Will manage with multimodal analgesia and further based upon workup.    Amount and/or Complexity of Data Reviewed  Radiology: ordered.    Risk  Prescription drug management.        ED Course as of 07/03/25 0127   Wed Jul 02, 2025 2317 On reevaluation, patient reports significant improvement in symptoms.  Results reviewed with patient and her  with patient's permission.  Questions answered to her satisfaction.       Medications   acetaminophen (Ofirmev) injection 1,000 mg (0 mg Intravenous Stopped 7/2/25 2246)   metoclopramide (REGLAN) injection 10 mg (10 mg Intravenous Given 7/2/25 2136)   ketorolac (TORADOL) injection 15 mg (15 mg Intravenous Given 7/2/25 2324)       ED Risk Strat Scores                    No data recorded        SBIRT 20yo+      Flowsheet Row Most Recent Value   Initial Alcohol Screen: US AUDIT-C     1. How often do you have a drink containing alcohol? 0 Filed at: 07/02/2025 2204   2. How many drinks containing alcohol do you have on a typical day you are drinking?  0 Filed at: 07/02/2025 2204   3b. FEMALE Any Age, or MALE 65+: How often do you have 4 or more drinks on one occassion? 0 Filed at: 07/02/2025 2204   Audit-C Score 0 Filed at: 07/02/2025 2204   ARIES: How many times in the past year have you...    Used an illegal drug or used a prescription medication for non-medical reasons? Never Filed at: 07/02/2025 2204                            History of Present Illness       Chief Complaint   Patient presents with    Head Injury     Reports being hit right lateral head with soft ball at [facility], no LOC, reports worsening headaches and fatihue, denies vision changes, presents with facial edema.        Past Medical History[1]   Past Surgical History[2]   Family History[3]   Social History[4]   E-Cigarette/Vaping    E-Cigarette Use Never User       E-Cigarette/Vaping Substances    Nicotine No     THC  No     CBD No     Flavoring No     Other No     Unknown No       I have reviewed and agree with the history as documented.     Patient is a 50-year-old female, with a history significant for postmenopausal status per my review of the medical record, who presents to the ED today for evaluation of a 3-day history of sudden onset and traumatic headache that occurred when patient was struck in the head by a softball at a practice.  Patient denies loss of consciousness/vomiting.  There is associated facial swelling/bruising.  Light exacerbates her discomfort.  Patient has been taking Tylenol, up to 3 g daily, to remit her discomfort.  Patient only took 1 g so far today.  Patient's , present in the room providing collateral history, states patient is not confused.  Patient denies chest pain, dyspnea, abdominal pain, injury elsewhere.  Patient is without other concerns at this time.        Review of Systems   Constitutional:  Negative for fever.   HENT:  Negative for trouble swallowing.    Eyes:  Negative for visual disturbance.   Respiratory:  Negative for shortness of breath.    Cardiovascular:  Negative for chest pain.   Gastrointestinal:  Negative for abdominal pain and vomiting.   Endocrine: Negative for polyuria.   Genitourinary:  Negative for dysuria.   Musculoskeletal:  Negative for gait problem.   Skin:  Negative for rash.   Allergic/Immunologic: Negative for environmental allergies.   Neurological:  Positive for headaches. Negative for weakness and numbness.   Hematological:  Negative for adenopathy.   Psychiatric/Behavioral:  Negative for confusion.    All other systems reviewed and are negative.          Objective       ED Triage Vitals   Temperature Pulse Blood Pressure Respirations SpO2 Patient Position - Orthostatic VS   07/02/25 1923 07/02/25 1923 07/02/25 1923 07/02/25 1923 07/02/25 1923 07/02/25 1923   98.6 °F (37 °C) 71 142/85 18 97 % Sitting      Temp Source Heart Rate Source BP Location FiO2 (%)  Pain Score    07/02/25 1923 07/02/25 1923 07/02/25 1923 -- 07/02/25 2246    Oral Monitor Right arm  No Pain      Vitals      Date and Time Temp Pulse SpO2 Resp BP Pain Score FACES Pain Rating User   07/02/25 2324 -- -- -- -- -- 1 -- KB   07/02/25 2251 -- -- -- -- -- No Pain -- KB   07/02/25 2246 -- 63 97 % -- 127/86 No Pain -- VISHAL   07/02/25 1923 98.6 °F (37 °C) 71 97 % 18 142/85 -- -- HR            Physical Exam  Vitals and nursing note reviewed.   Constitutional:       General: She is not in acute distress.     Appearance: She is not toxic-appearing.   HENT:      Head: Normocephalic.      Comments: Edema/bruising of the face.  Raccoon's eyes present.  Tenderness to palpation of the bilateral bony orbits    No septal hematoma     Right Ear: External ear normal.      Left Ear: External ear normal.      Nose: Nose normal. No rhinorrhea.      Mouth/Throat:      Mouth: Mucous membranes are moist.      Pharynx: Oropharynx is clear. No oropharyngeal exudate or posterior oropharyngeal erythema.      Comments: Uvula midline. No oropharyngeal or submandibular mass/swelling    Eyes:      General: No scleral icterus.        Right eye: No discharge.         Left eye: No discharge.      Conjunctiva/sclera: Conjunctivae normal.      Pupils: Pupils are equal, round, and reactive to light.     Neck:      Comments: Patient is spontaneously rotating their neck to the left and right during the history and physical exam interaction without difficulty or apparent discomfort    Cardiovascular:      Rate and Rhythm: Normal rate and regular rhythm.      Pulses: Normal pulses.      Heart sounds: Normal heart sounds. No murmur heard.     No friction rub. No gallop.      Comments: 2+ Radial  Pulmonary:      Effort: Pulmonary effort is normal. No respiratory distress.      Breath sounds: Normal breath sounds. No stridor. No wheezing, rhonchi or rales.   Abdominal:      General: Abdomen is flat. There is no distension.      Palpations: Abdomen  is soft.      Tenderness: There is no abdominal tenderness. There is no right CVA tenderness, left CVA tenderness, guarding or rebound.     Musculoskeletal:         General: No deformity.      Cervical back: Neck supple. No tenderness. No muscular tenderness.      Comments: No midline C, T, L-spine tenderness to palpation   Lymphadenopathy:      Cervical: No cervical adenopathy.     Skin:     General: Skin is warm and dry.      Capillary Refill: Capillary refill takes less than 2 seconds.     Neurological:      Mental Status: She is alert.      Comments: Cranial nerves 2-12 intact.  5/5 strength in all four extremities including finger extension against resistance.  Sensation to light touch subjectively intact/equal in all four extremities and the face.      Patient is speaking clearly in complete sentences.  Patient is answering appropriately and able to follow commands.    Psychiatric:         Mood and Affect: Mood normal.         Behavior: Behavior normal.         Results Reviewed       None            CT head without contrast   Final Interpretation by Lukasz Ruth MD (07/02 2307)         1. No acute intracranial hemorrhage or mass effect.   2. Left frontal scalp hematoma. No acute calvarial fracture                  Workstation performed: [id number]         CT facial bones without contrast   Final Interpretation by Lukasz Ruth MD (07/02 1408)      No evidence of acute maxillofacial fracture.               Workstation performed: [id number]         CT spine cervical without contrast   Final Interpretation by Lukasz Ruth MD (07/02 9376)      No evidence of acute cervical spine fracture or traumatic malalignment.                  Workstation performed: [id number]             Procedures    ED Medication and Procedure Management   Prior to Admission Medications   Prescriptions Last Dose Informant Patient Reported? Taking?   acetaminophen (TYLENOL) 325 mg tablet   Yes No   Sig: Take 650 mg by  mouth every 6 (six) hours as needed for mild pain   albuterol (Proventil HFA) 90 mcg/act inhaler   No No   Sig: Inhale 1 puff every 4 (four) hours as needed for wheezing   benzonatate (TESSALON) 200 MG capsule   No No   Sig: Take 1 capsule (200 mg total) by mouth 3 (three) times a day as needed for cough   Patient not taking: Reported on 7/2/2025   guaiFENesin (MUCINEX) 600 mg 12 hr tablet   No No   Sig: Take 2 tablets (1,200 mg total) by mouth every 12 (twelve) hours   Patient not taking: Reported on 7/2/2025   meclizine (ANTIVERT) 25 mg tablet   No No   Sig: Take 1 tablet (25 mg total) by mouth 3 (three) times a day as needed for dizziness   Patient not taking: Reported on 7/2/2025   methylPREDNISolone 4 MG tablet therapy pack   No No   Sig: Use as directed on package   Patient not taking: Reported on 7/2/2025   rosuvastatin (CRESTOR) 5 mg tablet   No No   Sig: TAKE 1 TABLET (5 MG TOTAL) BY MOUTH DAILY.   valACYclovir (VALTREX) 1,000 mg tablet   No No   Sig: Take 1 tablet (1,000 mg total) by mouth 2 (two) times a day for 2 doses      Facility-Administered Medications: None     Discharge Medication List as of 7/2/2025 11:17 PM        CONTINUE these medications which have NOT CHANGED    Details   acetaminophen (TYLENOL) 325 mg tablet Take 650 mg by mouth every 6 (six) hours as needed for mild pain, Historical Med      albuterol (Proventil HFA) 90 mcg/act inhaler Inhale 1 puff every 4 (four) hours as needed for wheezing, Starting Thu 2/13/2025, Normal      benzonatate (TESSALON) 200 MG capsule Take 1 capsule (200 mg total) by mouth 3 (three) times a day as needed for cough, Starting Thu 2/13/2025, Normal      guaiFENesin (MUCINEX) 600 mg 12 hr tablet Take 2 tablets (1,200 mg total) by mouth every 12 (twelve) hours, Starting Thu 2/13/2025, Normal      meclizine (ANTIVERT) 25 mg tablet Take 1 tablet (25 mg total) by mouth 3 (three) times a day as needed for dizziness, Starting Thu 2/13/2025, Normal       methylPREDNISolone 4 MG tablet therapy pack Use as directed on package, Normal      rosuvastatin (CRESTOR) 5 mg tablet TAKE 1 TABLET (5 MG TOTAL) BY MOUTH DAILY., Starting Fri 1/3/2025, Normal      valACYclovir (VALTREX) 1,000 mg tablet Take 1 tablet (1,000 mg total) by mouth 2 (two) times a day for 2 doses, Starting Tu2025, Until 2025, Normal             ED SEPSIS DOCUMENTATION   Time reflects when diagnosis was documented in both MDM as applicable and the Disposition within this note       Time User Action Codes Description Comment    2025  9:23 PM Jakob Leon Add [S09.90XA] Closed head injury, initial encounter     2025  9:23 PM Jakob Leon Add [R03.0] Elevated blood pressure reading     2025  9:23 PM Jakob Leon Add [T14.8XXA] Bruising     2025  9:23 PM Jakob Leon Add [S06.0XAA] Concussion     2025  9:23 PM Jakob Leon Add [R51.9] Acute headache     2025 11:16 PM Jakob Leon Add [T14.8XXA] Hematoma                      [1]   Past Medical History:  Diagnosis Date    Anemia     Dry eye syndrome of both eyes     Fibroids     2 cm calcified fibroid    HELLP syndrome     she does not have HTN     History of hemolysis, elevated liver enzymes, and low platelet (HELLP) syndrome 2009    History of infertility     IVF/ICSI    Irregular menses     Pulmonic regurgitation 2023    Tricuspid regurgitation 2023   [2]   Past Surgical History:  Procedure Laterality Date     SECTION      COLONOSCOPY      in [place] - normal.     COLONOSCOPY      ENDOMETRIAL BIOPSY  2014    proliferative endometrium    KNEE ARTHROSCOPY Right    [3]   Family History  Problem Relation Name Age of Onset    Cancer Mother Allie Minor 72        liver/bile duct    Diabetes Mother Allie Minor     Hyperlipidemia Mother Allie Minor     Osteopenia Mother Allie Minor     Liver cancer Mother Allie Minor         dx  in fall,  was in bile duct (rare)    Atrial fibrillation Mother Allie Mily     Cataracts Mother Allie Mily     Liver disease Mother Allie Mily     Lung cancer Mother Allie Mily     Diabetes Father Ulises Mily     Hypertension Father Ray Mily     Hyperlipidemia Father Ray Mily     Myasthenia gravis Father Ray Mily     No Known Problems Daughter      No Known Problems Maternal Grandmother      No Known Problems Maternal Grandfather      Breast cancer Paternal Grandmother Mckenzie Minor 62    No Known Problems Paternal Grandfather      Colon polyps Brother      No Known Problems Maternal Aunt      No Known Problems Paternal Aunt      No Known Problems Paternal Aunt      No Known Problems Paternal Aunt     [4]   Social History  Tobacco Use    Smoking status: Never    Smokeless tobacco: Never   Vaping Use    Vaping status: Never Used   Substance Use Topics    Alcohol use: Yes     Alcohol/week: 2.0 standard drinks of alcohol     Types: 2 Cans of beer per week     Comment: 1 glass few times a week entertaining     Drug use: No        Jakob Leon MD  07/03/25 0127

## 2025-07-03 NOTE — DISCHARGE INSTRUCTIONS
You were evaluated in the emergency department for: headache. You can access your current and pending results through [facility] [employer]. A radiologist will take a second look at your X-Rays, if you had any, and you will be contacted with any new findings.     You should follow-up with your primary care provider, as soon as possible, for re-evaluation.  If you do not have a primary care provider, I have referred you to [facility]. You will be contacted about scheduling an appointment. Their phone number is also included on this paperwork.  You have also been referred to concussion clinic and should follow-up with them as well.    Your workup revealed no emergent features at this time; however, many disease processes are dynamic:    Please, return to the emergency department if you experience new or worsening symptoms, fever, chest pain, shortness of breath, difficulty breathing, dizziness, abdominal pain, persistent nausea/vomiting, syncope or passing out, blood in your urine or stool, coughing up blood, leg swelling/pain, urinary retention, bowel or bladder incontinence, numbness between your legs.    Additionally, your blood pressure was measured to be high. This is something that you should discuss with your primary care provider and have re-checked within one week.      You may take 650mg of tylenol every four to six hours, not exceeding 3,000mg daily, for the management of your discomfort. You may also take ibuprofen, 400mg every six to eight hours.      CT spine cervical without contrast  Result Date: 7/2/2025  Impression: No evidence of acute cervical spine fracture or traumatic malalignment. Workstation performed: [id number]     CT facial bones without contrast  Result Date: 7/2/2025  Impression: No evidence of acute maxillofacial fracture. Workstation performed: [id number]     CT head without contrast  Result Date: 7/2/2025  Impression: 1. No acute intracranial hemorrhage or mass effect. 2.  Left frontal scalp hematoma. No acute calvarial fracture Workstation performed: [id number]

## 2025-07-07 ENCOUNTER — OFFICE VISIT (OUTPATIENT)
Age: 50
End: 2025-07-07
Payer: COMMERCIAL

## 2025-07-07 VITALS
HEIGHT: 68 IN | WEIGHT: 228 LBS | SYSTOLIC BLOOD PRESSURE: 124 MMHG | HEART RATE: 84 BPM | BODY MASS INDEX: 34.56 KG/M2 | TEMPERATURE: 97.8 F | OXYGEN SATURATION: 98 % | DIASTOLIC BLOOD PRESSURE: 72 MMHG

## 2025-07-07 DIAGNOSIS — S09.90XD TRAUMATIC INJURY OF HEAD, SUBSEQUENT ENCOUNTER: Primary | ICD-10-CM

## 2025-07-07 PROCEDURE — 99213 OFFICE O/P EST LOW 20 MIN: CPT

## 2025-07-07 RX ORDER — NORETHINDRONE ACETATE AND ETHINYL ESTRADIOL .03; 1.5 MG/1; MG/1
TABLET ORAL
COMMUNITY
End: 2025-07-07

## 2025-07-07 NOTE — PROGRESS NOTES
Name: Michelle Fernandez      : 1975      MRN: [id number]  Encounter Provider: Guilherme Brooks DO  Encounter Date: 2025   [facility]  :  Assessment & Plan  Traumatic injury of head, subsequent encounter  -Direct strike to the right temple with softball 8 days ago without loss of consciousness, without nausea or vomiting, presented to the ED 5 days ago with significant edema and ecchymosis, and severe headache.  -No evidence of fracture on CT imaging of head, neck, and facial bones  -Patient has continued to have some burning pain over the site of edema, controlling with Tylenol and Advil rotating every 4 hours, along with ice over the site  -She denies any nausea, vomiting, confusion, loss of consciousness, brain fog, focal deficits  -Advised her that she may have had a concussion, but her symptoms at this time are less worried, and most likely consistent with pain from compression of nerves over the site of edema/hematoma  -She can continue her daily activities as tolerated, and give us a call if her symptoms worsen  -No need for concussion clinic at this time.  -She will return for annual physical in 2 months              History of Present Illness   Direct strike to the right temple with softball 8 days ago without loss of consciousness, without nausea or vomiting, presented to the ED 5 days ago with significant edema and ecchymosis, and severe headache. No evidence of fracture on CT imaging of head, neck, and facial bones. Patient has continued to have some burning pain over the site of edema, controlling with Tylenol and Advil rotating every 4 hours, along with ice over the site. She denies any nausea, vomiting, confusion, loss of consciousness, brain fog, or focal deficits.  She has questions pertaining to how long will take for the hematoma to go away.  She also has questions pertaining to her vacation this upcoming week, if she can participate in  "daily activities.             Review of Systems   Constitutional:  Negative for chills and fever.   HENT:  Negative for ear pain and sore throat.    Eyes:  Negative for visual disturbance.   Respiratory:  Negative for cough and shortness of breath.    Cardiovascular:  Negative for chest pain and palpitations.   Gastrointestinal:  Negative for abdominal pain, nausea and vomiting.   Skin:  Negative for color change and rash.   Neurological:  Positive for headaches. Negative for dizziness, syncope and weakness.   Psychiatric/Behavioral:  Negative for confusion.    All other systems reviewed and are negative.      Objective   /72   Pulse 84   Temp 97.8 °F (36.6 °C)   Ht 5' 8\" (1.727 m)   Wt 103 kg (228 lb)   LMP 09/26/2023 (Exact Date)   SpO2 98%   BMI 34.67 kg/m²      Physical Exam  Vitals and nursing note reviewed.   Constitutional:       General: She is not in acute distress.     Appearance: She is well-developed.   HENT:      Head:        Comments: Yellowing ecchymosis over right periorbital region  6 cm x 6 cm area of fluctuant hematoma just superior and anterior to right temple  Generalized edema over right cheek and jaw     Right Ear: Tympanic membrane, ear canal and external ear normal.      Nose: Nose normal.     Eyes:      Conjunctiva/sclera: Conjunctivae normal.       Cardiovascular:      Rate and Rhythm: Normal rate and regular rhythm.      Pulses: Normal pulses.   Pulmonary:      Effort: Pulmonary effort is normal. No respiratory distress.   Abdominal:      General: There is no distension.      Tenderness: There is no abdominal tenderness.     Musculoskeletal:         General: Normal range of motion.      Cervical back: Neck supple.     Skin:     General: Skin is warm and dry.      Capillary Refill: Capillary refill takes less than 2 seconds.      Findings: Bruising present. No erythema or rash.     Neurological:      General: No focal deficit present.      Mental Status: She is alert and " oriented to person, place, and time. Mental status is at baseline.      Motor: No weakness.     Psychiatric:         Mood and Affect: Mood normal.         Behavior: Behavior normal.

## 2025-08-08 DIAGNOSIS — E78.00 PURE HYPERCHOLESTEROLEMIA: ICD-10-CM

## 2025-08-08 RX ORDER — ROSUVASTATIN CALCIUM 5 MG/1
5 TABLET, COATED ORAL DAILY
Qty: 90 TABLET | Refills: 1 | Status: SHIPPED | OUTPATIENT
Start: 2025-08-08